# Patient Record
Sex: MALE | Race: WHITE | NOT HISPANIC OR LATINO | Employment: FULL TIME | URBAN - METROPOLITAN AREA
[De-identification: names, ages, dates, MRNs, and addresses within clinical notes are randomized per-mention and may not be internally consistent; named-entity substitution may affect disease eponyms.]

---

## 2017-02-05 ENCOUNTER — HOSPITAL ENCOUNTER (EMERGENCY)
Facility: HOSPITAL | Age: 62
Discharge: HOME | End: 2017-02-05
Attending: EMERGENCY MEDICINE | Admitting: EMERGENCY MEDICINE
Payer: COMMERCIAL

## 2017-02-05 ENCOUNTER — APPOINTMENT (EMERGENCY)
Dept: RADIOLOGY | Facility: HOSPITAL | Age: 62
End: 2017-02-05
Payer: COMMERCIAL

## 2017-02-05 VITALS
WEIGHT: 227.9 LBS | HEIGHT: 75 IN | RESPIRATION RATE: 20 BRPM | SYSTOLIC BLOOD PRESSURE: 138 MMHG | OXYGEN SATURATION: 98 % | TEMPERATURE: 98 F | BODY MASS INDEX: 28.34 KG/M2 | DIASTOLIC BLOOD PRESSURE: 63 MMHG | HEART RATE: 92 BPM

## 2017-02-05 DIAGNOSIS — L97.919 DIABETIC ULCER OF RIGHT LOWER LEG (HCC): ICD-10-CM

## 2017-02-05 DIAGNOSIS — J40 BRONCHITIS: Primary | ICD-10-CM

## 2017-02-05 DIAGNOSIS — E11.622 DIABETIC ULCER OF RIGHT LOWER LEG (HCC): ICD-10-CM

## 2017-02-05 LAB
ANION GAP SERPL CALCULATED.3IONS-SCNC: 8 MMOL/L (ref 4–13)
BASOPHILS # BLD AUTO: 0.02 THOUSANDS/ΜL (ref 0–0.1)
BASOPHILS NFR BLD AUTO: 0 % (ref 0–1)
BUN SERPL-MCNC: 14 MG/DL (ref 5–25)
CALCIUM SERPL-MCNC: 9.1 MG/DL (ref 8.3–10.1)
CHLORIDE SERPL-SCNC: 97 MMOL/L (ref 100–108)
CO2 SERPL-SCNC: 30 MMOL/L (ref 21–32)
CREAT SERPL-MCNC: 1.31 MG/DL (ref 0.6–1.3)
EOSINOPHIL # BLD AUTO: 0 THOUSAND/ΜL (ref 0–0.61)
EOSINOPHIL NFR BLD AUTO: 0 % (ref 0–6)
ERYTHROCYTE [DISTWIDTH] IN BLOOD BY AUTOMATED COUNT: 13.9 % (ref 11.6–15.1)
GFR SERPL CREATININE-BSD FRML MDRD: 55.3 ML/MIN/1.73SQ M
GLUCOSE SERPL-MCNC: 81 MG/DL (ref 65–140)
GLUCOSE SERPL-MCNC: 91 MG/DL (ref 65–140)
HCT VFR BLD AUTO: 39.8 % (ref 36.5–49.3)
HGB BLD-MCNC: 12.8 G/DL (ref 12–17)
LYMPHOCYTES # BLD AUTO: 0.89 THOUSANDS/ΜL (ref 0.6–4.47)
LYMPHOCYTES NFR BLD AUTO: 7 % (ref 14–44)
MCH RBC QN AUTO: 25.8 PG (ref 26.8–34.3)
MCHC RBC AUTO-ENTMCNC: 32.2 G/DL (ref 31.4–37.4)
MCV RBC AUTO: 80 FL (ref 82–98)
MONOCYTES # BLD AUTO: 1.02 THOUSAND/ΜL (ref 0.17–1.22)
MONOCYTES NFR BLD AUTO: 8 % (ref 4–12)
NEUTROPHILS # BLD AUTO: 10.84 THOUSANDS/ΜL (ref 1.85–7.62)
NEUTS SEG NFR BLD AUTO: 85 % (ref 43–75)
NRBC BLD AUTO-RTO: 0 /100 WBCS
PLATELET # BLD AUTO: 302 THOUSANDS/UL (ref 149–390)
PMV BLD AUTO: 10.9 FL (ref 8.9–12.7)
POTASSIUM SERPL-SCNC: 4.2 MMOL/L (ref 3.5–5.3)
RBC # BLD AUTO: 4.96 MILLION/UL (ref 3.88–5.62)
SODIUM SERPL-SCNC: 135 MMOL/L (ref 136–145)
TROPONIN I SERPL-MCNC: <0.02 NG/ML
WBC # BLD AUTO: 12.84 THOUSAND/UL (ref 4.31–10.16)

## 2017-02-05 PROCEDURE — 80048 BASIC METABOLIC PNL TOTAL CA: CPT | Performed by: EMERGENCY MEDICINE

## 2017-02-05 PROCEDURE — 85025 COMPLETE CBC W/AUTO DIFF WBC: CPT | Performed by: EMERGENCY MEDICINE

## 2017-02-05 PROCEDURE — 99285 EMERGENCY DEPT VISIT HI MDM: CPT

## 2017-02-05 PROCEDURE — 93005 ELECTROCARDIOGRAM TRACING: CPT | Performed by: EMERGENCY MEDICINE

## 2017-02-05 PROCEDURE — 36415 COLL VENOUS BLD VENIPUNCTURE: CPT | Performed by: EMERGENCY MEDICINE

## 2017-02-05 PROCEDURE — 84484 ASSAY OF TROPONIN QUANT: CPT | Performed by: EMERGENCY MEDICINE

## 2017-02-05 PROCEDURE — 71020 HB CHEST X-RAY 2VW FRONTAL&LATL: CPT

## 2017-02-05 PROCEDURE — 94640 AIRWAY INHALATION TREATMENT: CPT

## 2017-02-05 PROCEDURE — 87040 BLOOD CULTURE FOR BACTERIA: CPT | Performed by: EMERGENCY MEDICINE

## 2017-02-05 PROCEDURE — 82948 REAGENT STRIP/BLOOD GLUCOSE: CPT

## 2017-02-05 RX ORDER — BENZONATATE 100 MG/1
100 CAPSULE ORAL 3 TIMES DAILY PRN
COMMUNITY

## 2017-02-05 RX ORDER — SULFAMETHOXAZOLE AND TRIMETHOPRIM 800; 160 MG/1; MG/1
1 TABLET ORAL 2 TIMES DAILY
COMMUNITY
End: 2017-02-15 | Stop reason: HOSPADM

## 2017-02-05 RX ORDER — ALBUTEROL SULFATE 2.5 MG/3ML
2.5 SOLUTION RESPIRATORY (INHALATION) ONCE
Status: COMPLETED | OUTPATIENT
Start: 2017-02-05 | End: 2017-02-05

## 2017-02-05 RX ORDER — ALBUTEROL SULFATE 90 UG/1
2 AEROSOL, METERED RESPIRATORY (INHALATION) EVERY 4 HOURS PRN
Qty: 1 INHALER | Refills: 0 | Status: SHIPPED | OUTPATIENT
Start: 2017-02-05

## 2017-02-05 RX ADMIN — IPRATROPIUM BROMIDE 0.5 MG: 0.5 SOLUTION RESPIRATORY (INHALATION) at 19:34

## 2017-02-05 RX ADMIN — IPRATROPIUM BROMIDE 0.5 MG: 0.5 SOLUTION RESPIRATORY (INHALATION) at 21:44

## 2017-02-05 RX ADMIN — ALBUTEROL SULFATE 2.5 MG: 2.5 SOLUTION RESPIRATORY (INHALATION) at 19:34

## 2017-02-05 RX ADMIN — ALBUTEROL SULFATE 2.5 MG: 2.5 SOLUTION RESPIRATORY (INHALATION) at 21:44

## 2017-02-06 LAB
ATRIAL RATE: 94 BPM
P AXIS: 42 DEGREES
PR INTERVAL: 152 MS
QRS AXIS: 16 DEGREES
QRSD INTERVAL: 68 MS
QT INTERVAL: 318 MS
QTC INTERVAL: 397 MS
T WAVE AXIS: 54 DEGREES
VENTRICULAR RATE: 94 BPM

## 2017-02-08 ENCOUNTER — APPOINTMENT (EMERGENCY)
Dept: RADIOLOGY | Facility: HOSPITAL | Age: 62
DRG: 206 | End: 2017-02-08
Payer: COMMERCIAL

## 2017-02-08 ENCOUNTER — APPOINTMENT (EMERGENCY)
Dept: CT IMAGING | Facility: HOSPITAL | Age: 62
DRG: 206 | End: 2017-02-08
Payer: COMMERCIAL

## 2017-02-08 ENCOUNTER — HOSPITAL ENCOUNTER (INPATIENT)
Facility: HOSPITAL | Age: 62
LOS: 7 days | Discharge: HOME | DRG: 206 | End: 2017-02-15
Attending: EMERGENCY MEDICINE | Admitting: ANESTHESIOLOGY
Payer: COMMERCIAL

## 2017-02-08 DIAGNOSIS — I21.4 NSTEMI (NON-ST ELEVATED MYOCARDIAL INFARCTION) (HCC): ICD-10-CM

## 2017-02-08 DIAGNOSIS — K21.9 GERD (GASTROESOPHAGEAL REFLUX DISEASE): ICD-10-CM

## 2017-02-08 DIAGNOSIS — L97.519 DIABETIC ULCER OF BOTH FEET ASSOCIATED WITH TYPE 2 DIABETES MELLITUS (HCC): ICD-10-CM

## 2017-02-08 DIAGNOSIS — R06.02 SOB (SHORTNESS OF BREATH): ICD-10-CM

## 2017-02-08 DIAGNOSIS — E11.9 DIABETES MELLITUS (HCC): ICD-10-CM

## 2017-02-08 DIAGNOSIS — R63.4 WEIGHT LOSS: ICD-10-CM

## 2017-02-08 DIAGNOSIS — L97.529 DIABETIC ULCER OF BOTH FEET ASSOCIATED WITH TYPE 2 DIABETES MELLITUS (HCC): ICD-10-CM

## 2017-02-08 DIAGNOSIS — E11.621 DIABETIC ULCER OF BOTH FEET ASSOCIATED WITH TYPE 2 DIABETES MELLITUS (HCC): ICD-10-CM

## 2017-02-08 DIAGNOSIS — D64.9 ANEMIA, UNSPECIFIED TYPE: ICD-10-CM

## 2017-02-08 DIAGNOSIS — J81.1 PULMONARY EDEMA: Primary | ICD-10-CM

## 2017-02-08 PROBLEM — R53.1 WEAKNESS: Status: ACTIVE | Noted: 2017-02-08

## 2017-02-08 PROBLEM — R91.8 LUNG NODULES: Status: ACTIVE | Noted: 2017-02-08

## 2017-02-08 PROBLEM — L97.509 TYPE 2 DIABETES MELLITUS WITH FOOT ULCER (HCC): Status: ACTIVE | Noted: 2017-02-08

## 2017-02-08 PROBLEM — L97.929 ULCERS OF BOTH LOWER EXTREMITIES (HCC): Status: ACTIVE | Noted: 2017-02-08

## 2017-02-08 PROBLEM — R79.89 ELEVATED TROPONIN I LEVEL: Status: ACTIVE | Noted: 2017-02-08

## 2017-02-08 PROBLEM — R77.8 ELEVATED TROPONIN I LEVEL: Status: ACTIVE | Noted: 2017-02-08

## 2017-02-08 PROBLEM — L97.919 ULCERS OF BOTH LOWER EXTREMITIES (HCC): Status: ACTIVE | Noted: 2017-02-08

## 2017-02-08 PROBLEM — E87.1 HYPONATREMIA: Status: ACTIVE | Noted: 2017-02-08

## 2017-02-08 LAB
ALBUMIN SERPL BCP-MCNC: 2.9 G/DL (ref 3.5–5)
ALP SERPL-CCNC: 78 U/L (ref 46–116)
ALT SERPL W P-5'-P-CCNC: 32 U/L (ref 12–78)
ANION GAP SERPL CALCULATED.3IONS-SCNC: 10 MMOL/L (ref 4–13)
APAP SERPL-MCNC: <2 UG/ML (ref 10–30)
APTT PPP: 39 SECONDS (ref 24–36)
AST SERPL W P-5'-P-CCNC: 26 U/L (ref 5–45)
ATRIAL RATE: 101 BPM
BACTERIA UR QL AUTO: ABNORMAL /HPF
BASE EXCESS BLDA CALC-SCNC: 2 MMOL/L
BASOPHILS # BLD MANUAL: 0 THOUSAND/UL (ref 0–0.1)
BASOPHILS NFR MAR MANUAL: 0 % (ref 0–1)
BILIRUB SERPL-MCNC: 0.6 MG/DL (ref 0.2–1)
BILIRUB UR QL STRIP: NEGATIVE
BUN SERPL-MCNC: 21 MG/DL (ref 5–25)
CALCIUM SERPL-MCNC: 8.4 MG/DL (ref 8.3–10.1)
CHLORIDE SERPL-SCNC: 97 MMOL/L (ref 100–108)
CLARITY UR: ABNORMAL
CO2 SERPL-SCNC: 26 MMOL/L (ref 21–32)
COLOR UR: YELLOW
CREAT SERPL-MCNC: 1.24 MG/DL (ref 0.6–1.3)
EOSINOPHIL # BLD MANUAL: 0 THOUSAND/UL (ref 0–0.4)
EOSINOPHIL NFR BLD MANUAL: 0 % (ref 0–6)
ERYTHROCYTE [DISTWIDTH] IN BLOOD BY AUTOMATED COUNT: 13.8 % (ref 11.6–15.1)
ETHANOL SERPL-MCNC: <3 MG/DL (ref 0–3)
FLUAV AG SPEC QL IA: NEGATIVE
FLUBV AG SPEC QL IA: NEGATIVE
GFR SERPL CREATININE-BSD FRML MDRD: 58.9 ML/MIN/1.73SQ M
GLUCOSE SERPL-MCNC: 153 MG/DL (ref 65–140)
GLUCOSE SERPL-MCNC: 220 MG/DL (ref 65–140)
GLUCOSE SERPL-MCNC: 246 MG/DL (ref 65–140)
GLUCOSE SERPL-MCNC: 322 MG/DL (ref 65–140)
GLUCOSE SERPL-MCNC: 75 MG/DL (ref 65–140)
GLUCOSE SERPL-MCNC: 98 MG/DL (ref 65–140)
GLUCOSE UR STRIP-MCNC: ABNORMAL MG/DL
HCO3 BLDA-SCNC: 24.3 MMOL/L (ref 22–28)
HCT VFR BLD AUTO: 36.1 % (ref 36.5–49.3)
HGB BLD-MCNC: 11.6 G/DL (ref 12–17)
HGB UR QL STRIP.AUTO: ABNORMAL
INR PPP: 1.22 (ref 0.86–1.16)
KETONES UR STRIP-MCNC: NEGATIVE MG/DL
LACTATE SERPL-SCNC: 1.3 MMOL/L (ref 0.5–2)
LEUKOCYTE ESTERASE UR QL STRIP: NEGATIVE
LYMPHOCYTES # BLD AUTO: 0.44 THOUSAND/UL (ref 0.6–4.47)
LYMPHOCYTES # BLD AUTO: 4 % (ref 14–44)
MCH RBC QN AUTO: 25.4 PG (ref 26.8–34.3)
MCHC RBC AUTO-ENTMCNC: 32.1 G/DL (ref 31.4–37.4)
MCV RBC AUTO: 79 FL (ref 82–98)
MONOCYTES # BLD AUTO: 0.65 THOUSAND/UL (ref 0–1.22)
MONOCYTES NFR BLD: 6 % (ref 4–12)
NEUTROPHILS # BLD MANUAL: 9.79 THOUSAND/UL (ref 1.85–7.62)
NEUTS BAND NFR BLD MANUAL: 4 % (ref 0–8)
NEUTS SEG NFR BLD AUTO: 86 % (ref 43–75)
NITRITE UR QL STRIP: NEGATIVE
NON-SQ EPI CELLS URNS QL MICRO: ABNORMAL /HPF
NRBC BLD AUTO-RTO: 0 /100 WBCS
NT-PROBNP SERPL-MCNC: 2414 PG/ML
O2 CT BLDA-SCNC: 17.2 ML/DL (ref 16–23)
OXYHGB MFR BLDA: 96.4 % (ref 94–97)
P AXIS: 49 DEGREES
PCO2 BLDA: 30.9 MM HG (ref 36–44)
PH BLDA: 7.51 [PH] (ref 7.35–7.45)
PH UR STRIP.AUTO: 7 [PH] (ref 4.5–8)
PLATELET # BLD AUTO: 251 THOUSANDS/UL (ref 149–390)
PLATELET # BLD AUTO: 261 THOUSANDS/UL (ref 149–390)
PLATELET BLD QL SMEAR: ADEQUATE
PMV BLD AUTO: 11.6 FL (ref 8.9–12.7)
PMV BLD AUTO: 12.2 FL (ref 8.9–12.7)
PO2 BLDA: 91 MM HG (ref 75–129)
POTASSIUM SERPL-SCNC: 3.9 MMOL/L (ref 3.5–5.3)
PR INTERVAL: 144 MS
PROT SERPL-MCNC: 7.2 G/DL (ref 6.4–8.2)
PROT UR STRIP-MCNC: ABNORMAL MG/DL
PROTHROMBIN TIME: 15.2 SECONDS (ref 12–14.3)
QRS AXIS: 10 DEGREES
QRSD INTERVAL: 82 MS
QT INTERVAL: 332 MS
QTC INTERVAL: 430 MS
RBC # BLD AUTO: 4.56 MILLION/UL (ref 3.88–5.62)
RBC #/AREA URNS AUTO: ABNORMAL /HPF
SALICYLATES SERPL-MCNC: 5.3 MG/DL (ref 3–20)
SODIUM SERPL-SCNC: 133 MMOL/L (ref 136–145)
SP GR UR STRIP.AUTO: 1.01 (ref 1–1.03)
T WAVE AXIS: 66 DEGREES
TOTAL CELLS COUNTED SPEC: 100
TROPONIN I SERPL-MCNC: 0.09 NG/ML
TROPONIN I SERPL-MCNC: 0.13 NG/ML
TROPONIN I SERPL-MCNC: 0.21 NG/ML
TSH SERPL DL<=0.05 MIU/L-ACNC: 0.72 UIU/ML (ref 0.36–3.74)
UROBILINOGEN UR QL STRIP.AUTO: 1 E.U./DL
VENTRICULAR RATE: 101 BPM
WBC # BLD AUTO: 10.88 THOUSAND/UL (ref 4.31–10.16)
WBC #/AREA URNS AUTO: ABNORMAL /HPF

## 2017-02-08 PROCEDURE — A9270 NON-COVERED ITEM OR SERVICE: HCPCS | Performed by: NURSE PRACTITIONER

## 2017-02-08 PROCEDURE — 84484 ASSAY OF TROPONIN QUANT: CPT | Performed by: NURSE PRACTITIONER

## 2017-02-08 PROCEDURE — 87205 SMEAR GRAM STAIN: CPT | Performed by: PHYSICIAN ASSISTANT

## 2017-02-08 PROCEDURE — 87798 DETECT AGENT NOS DNA AMP: CPT | Performed by: NURSE PRACTITIONER

## 2017-02-08 PROCEDURE — G0480 DRUG TEST DEF 1-7 CLASSES: HCPCS | Performed by: PHYSICIAN ASSISTANT

## 2017-02-08 PROCEDURE — 36600 WITHDRAWAL OF ARTERIAL BLOOD: CPT

## 2017-02-08 PROCEDURE — 82805 BLOOD GASES W/O2 SATURATION: CPT | Performed by: PHYSICIAN ASSISTANT

## 2017-02-08 PROCEDURE — 81001 URINALYSIS AUTO W/SCOPE: CPT | Performed by: NURSE PRACTITIONER

## 2017-02-08 PROCEDURE — 80320 DRUG SCREEN QUANTALCOHOLS: CPT | Performed by: PHYSICIAN ASSISTANT

## 2017-02-08 PROCEDURE — 94640 AIRWAY INHALATION TREATMENT: CPT

## 2017-02-08 PROCEDURE — 84484 ASSAY OF TROPONIN QUANT: CPT | Performed by: EMERGENCY MEDICINE

## 2017-02-08 PROCEDURE — 80053 COMPREHEN METABOLIC PANEL: CPT | Performed by: PHYSICIAN ASSISTANT

## 2017-02-08 PROCEDURE — 99285 EMERGENCY DEPT VISIT HI MDM: CPT

## 2017-02-08 PROCEDURE — 96374 THER/PROPH/DIAG INJ IV PUSH: CPT

## 2017-02-08 PROCEDURE — 85007 BL SMEAR W/DIFF WBC COUNT: CPT | Performed by: EMERGENCY MEDICINE

## 2017-02-08 PROCEDURE — 85049 AUTOMATED PLATELET COUNT: CPT | Performed by: NURSE PRACTITIONER

## 2017-02-08 PROCEDURE — 71010 HB CHEST X-RAY 1 VIEW FRONTAL (PORTABLE): CPT

## 2017-02-08 PROCEDURE — 85610 PROTHROMBIN TIME: CPT | Performed by: PHYSICIAN ASSISTANT

## 2017-02-08 PROCEDURE — 93005 ELECTROCARDIOGRAM TRACING: CPT | Performed by: EMERGENCY MEDICINE

## 2017-02-08 PROCEDURE — 71275 CT ANGIOGRAPHY CHEST: CPT

## 2017-02-08 PROCEDURE — A9270 NON-COVERED ITEM OR SERVICE: HCPCS | Performed by: PHYSICIAN ASSISTANT

## 2017-02-08 PROCEDURE — 83880 ASSAY OF NATRIURETIC PEPTIDE: CPT | Performed by: EMERGENCY MEDICINE

## 2017-02-08 PROCEDURE — 83605 ASSAY OF LACTIC ACID: CPT | Performed by: PHYSICIAN ASSISTANT

## 2017-02-08 PROCEDURE — 85730 THROMBOPLASTIN TIME PARTIAL: CPT | Performed by: PHYSICIAN ASSISTANT

## 2017-02-08 PROCEDURE — A9270 NON-COVERED ITEM OR SERVICE: HCPCS | Performed by: EMERGENCY MEDICINE

## 2017-02-08 PROCEDURE — 96361 HYDRATE IV INFUSION ADD-ON: CPT

## 2017-02-08 PROCEDURE — 36415 COLL VENOUS BLD VENIPUNCTURE: CPT | Performed by: EMERGENCY MEDICINE

## 2017-02-08 PROCEDURE — 87040 BLOOD CULTURE FOR BACTERIA: CPT | Performed by: PHYSICIAN ASSISTANT

## 2017-02-08 PROCEDURE — 84443 ASSAY THYROID STIM HORMONE: CPT | Performed by: NURSE PRACTITIONER

## 2017-02-08 PROCEDURE — 94760 N-INVAS EAR/PLS OXIMETRY 1: CPT

## 2017-02-08 PROCEDURE — 80329 ANALGESICS NON-OPIOID 1 OR 2: CPT | Performed by: PHYSICIAN ASSISTANT

## 2017-02-08 PROCEDURE — 85027 COMPLETE CBC AUTOMATED: CPT | Performed by: EMERGENCY MEDICINE

## 2017-02-08 PROCEDURE — 87086 URINE CULTURE/COLONY COUNT: CPT | Performed by: NURSE PRACTITIONER

## 2017-02-08 PROCEDURE — 87070 CULTURE OTHR SPECIMN AEROBIC: CPT | Performed by: PHYSICIAN ASSISTANT

## 2017-02-08 PROCEDURE — 70450 CT HEAD/BRAIN W/O DYE: CPT

## 2017-02-08 PROCEDURE — 93005 ELECTROCARDIOGRAM TRACING: CPT | Performed by: PHYSICIAN ASSISTANT

## 2017-02-08 PROCEDURE — 82948 REAGENT STRIP/BLOOD GLUCOSE: CPT

## 2017-02-08 PROCEDURE — 87400 INFLUENZA A/B EACH AG IA: CPT | Performed by: NURSE PRACTITIONER

## 2017-02-08 RX ORDER — SODIUM CHLORIDE 9 MG/ML
125 INJECTION, SOLUTION INTRAVENOUS CONTINUOUS
Status: DISCONTINUED | OUTPATIENT
Start: 2017-02-08 | End: 2017-02-10

## 2017-02-08 RX ORDER — HEPARIN SODIUM 5000 [USP'U]/ML
5000 INJECTION, SOLUTION INTRAVENOUS; SUBCUTANEOUS EVERY 8 HOURS SCHEDULED
Status: DISCONTINUED | OUTPATIENT
Start: 2017-02-08 | End: 2017-02-15 | Stop reason: HOSPADM

## 2017-02-08 RX ORDER — LEVALBUTEROL 1.25 MG/.5ML
1.25 SOLUTION, CONCENTRATE RESPIRATORY (INHALATION)
Status: DISCONTINUED | OUTPATIENT
Start: 2017-02-08 | End: 2017-02-09

## 2017-02-08 RX ORDER — OXYCODONE HYDROCHLORIDE 5 MG/1
2.5 TABLET ORAL ONCE
Status: COMPLETED | OUTPATIENT
Start: 2017-02-08 | End: 2017-02-08

## 2017-02-08 RX ORDER — ACETAMINOPHEN 325 MG/1
650 TABLET ORAL EVERY 6 HOURS PRN
Status: DISCONTINUED | OUTPATIENT
Start: 2017-02-08 | End: 2017-02-09

## 2017-02-08 RX ORDER — ASPIRIN 81 MG/1
324 TABLET, CHEWABLE ORAL ONCE
Status: COMPLETED | OUTPATIENT
Start: 2017-02-08 | End: 2017-02-08

## 2017-02-08 RX ORDER — PANTOPRAZOLE SODIUM 40 MG/1
40 TABLET, DELAYED RELEASE ORAL
Status: DISCONTINUED | OUTPATIENT
Start: 2017-02-08 | End: 2017-02-15 | Stop reason: HOSPADM

## 2017-02-08 RX ORDER — ONDANSETRON 2 MG/ML
4 INJECTION INTRAMUSCULAR; INTRAVENOUS EVERY 6 HOURS PRN
Status: DISCONTINUED | OUTPATIENT
Start: 2017-02-08 | End: 2017-02-15 | Stop reason: HOSPADM

## 2017-02-08 RX ORDER — FUROSEMIDE 10 MG/ML
20 INJECTION INTRAMUSCULAR; INTRAVENOUS ONCE
Status: COMPLETED | OUTPATIENT
Start: 2017-02-08 | End: 2017-02-08

## 2017-02-08 RX ADMIN — ASPIRIN 81 MG 324 MG: 81 TABLET ORAL at 11:39

## 2017-02-08 RX ADMIN — LEVALBUTEROL HYDROCHLORIDE 1.25 MG: 1.25 SOLUTION, CONCENTRATE RESPIRATORY (INHALATION) at 20:04

## 2017-02-08 RX ADMIN — INSULIN LISPRO 2 UNITS: 100 INJECTION, SOLUTION INTRAVENOUS; SUBCUTANEOUS at 21:28

## 2017-02-08 RX ADMIN — HEPARIN SODIUM 5000 UNITS: 5000 INJECTION, SOLUTION INTRAVENOUS; SUBCUTANEOUS at 21:30

## 2017-02-08 RX ADMIN — IPRATROPIUM BROMIDE 0.5 MG: 0.5 SOLUTION RESPIRATORY (INHALATION) at 16:18

## 2017-02-08 RX ADMIN — SODIUM CHLORIDE 1000 ML: 0.9 INJECTION, SOLUTION INTRAVENOUS at 11:38

## 2017-02-08 RX ADMIN — IPRATROPIUM BROMIDE 0.5 MG: 0.5 SOLUTION RESPIRATORY (INHALATION) at 20:04

## 2017-02-08 RX ADMIN — ACETAMINOPHEN 650 MG: 325 TABLET ORAL at 19:15

## 2017-02-08 RX ADMIN — OXYCODONE HYDROCHLORIDE 2.5 MG: 5 TABLET ORAL at 21:18

## 2017-02-08 RX ADMIN — FUROSEMIDE 20 MG: 10 INJECTION, SOLUTION INTRAMUSCULAR; INTRAVENOUS at 13:38

## 2017-02-08 RX ADMIN — SODIUM CHLORIDE 125 ML/HR: 0.9 INJECTION, SOLUTION INTRAVENOUS at 16:06

## 2017-02-08 RX ADMIN — IOHEXOL 85 ML: 350 INJECTION, SOLUTION INTRAVENOUS at 12:45

## 2017-02-08 RX ADMIN — LEVALBUTEROL HYDROCHLORIDE 1.25 MG: 1.25 SOLUTION, CONCENTRATE RESPIRATORY (INHALATION) at 16:18

## 2017-02-08 RX ADMIN — HEPARIN SODIUM 5000 UNITS: 5000 INJECTION, SOLUTION INTRAVENOUS; SUBCUTANEOUS at 16:19

## 2017-02-08 RX ADMIN — PANTOPRAZOLE SODIUM 40 MG: 40 TABLET, DELAYED RELEASE ORAL at 16:20

## 2017-02-09 ENCOUNTER — APPOINTMENT (INPATIENT)
Dept: CT IMAGING | Facility: HOSPITAL | Age: 62
DRG: 206 | End: 2017-02-09
Payer: COMMERCIAL

## 2017-02-09 ENCOUNTER — ANESTHESIA EVENT (INPATIENT)
Dept: PERIOP | Facility: HOSPITAL | Age: 62
DRG: 206 | End: 2017-02-09
Payer: COMMERCIAL

## 2017-02-09 ENCOUNTER — APPOINTMENT (INPATIENT)
Dept: NON INVASIVE DIAGNOSTICS | Facility: HOSPITAL | Age: 62
DRG: 206 | End: 2017-02-09
Payer: COMMERCIAL

## 2017-02-09 LAB
ALBUMIN SERPL BCP-MCNC: 2.4 G/DL (ref 3.5–5)
ALP SERPL-CCNC: 78 U/L (ref 46–116)
ALT SERPL W P-5'-P-CCNC: 35 U/L (ref 12–78)
ANION GAP SERPL CALCULATED.3IONS-SCNC: 7 MMOL/L (ref 4–13)
AST SERPL W P-5'-P-CCNC: 44 U/L (ref 5–45)
BASOPHILS # BLD AUTO: 0.03 THOUSANDS/ΜL (ref 0–0.1)
BASOPHILS NFR BLD AUTO: 0 % (ref 0–1)
BILIRUB SERPL-MCNC: 0.4 MG/DL (ref 0.2–1)
BUN SERPL-MCNC: 14 MG/DL (ref 5–25)
CALCIUM SERPL-MCNC: 8.1 MG/DL (ref 8.3–10.1)
CHLORIDE SERPL-SCNC: 101 MMOL/L (ref 100–108)
CO2 SERPL-SCNC: 27 MMOL/L (ref 21–32)
CREAT SERPL-MCNC: 1.14 MG/DL (ref 0.6–1.3)
EOSINOPHIL # BLD AUTO: 0.23 THOUSAND/ΜL (ref 0–0.61)
EOSINOPHIL NFR BLD AUTO: 3 % (ref 0–6)
ERYTHROCYTE [DISTWIDTH] IN BLOOD BY AUTOMATED COUNT: 14.2 % (ref 11.6–15.1)
EST. AVERAGE GLUCOSE BLD GHB EST-MCNC: 249 MG/DL
FERRITIN SERPL-MCNC: 650 NG/ML (ref 8–388)
FLUAV AG SPEC QL: NORMAL
FLUBV AG SPEC QL: NORMAL
GFR SERPL CREATININE-BSD FRML MDRD: >60 ML/MIN/1.73SQ M
GLUCOSE SERPL-MCNC: 128 MG/DL (ref 65–140)
GLUCOSE SERPL-MCNC: 142 MG/DL (ref 65–140)
GLUCOSE SERPL-MCNC: 150 MG/DL (ref 65–140)
GLUCOSE SERPL-MCNC: 201 MG/DL (ref 65–140)
GLUCOSE SERPL-MCNC: 213 MG/DL (ref 65–140)
GLUCOSE SERPL-MCNC: 232 MG/DL (ref 65–140)
HBA1C MFR BLD: 10.3 % (ref 4.2–6.3)
HCT VFR BLD AUTO: 35.1 % (ref 36.5–49.3)
HGB BLD-MCNC: 11 G/DL (ref 12–17)
IRON SATN MFR SERPL: 13 %
IRON SERPL-MCNC: 21 UG/DL (ref 65–175)
LYMPHOCYTES # BLD AUTO: 0.83 THOUSANDS/ΜL (ref 0.6–4.47)
LYMPHOCYTES NFR BLD AUTO: 12 % (ref 14–44)
MCH RBC QN AUTO: 25.6 PG (ref 26.8–34.3)
MCHC RBC AUTO-ENTMCNC: 31.3 G/DL (ref 31.4–37.4)
MCV RBC AUTO: 82 FL (ref 82–98)
MONOCYTES # BLD AUTO: 0.61 THOUSAND/ΜL (ref 0.17–1.22)
MONOCYTES NFR BLD AUTO: 9 % (ref 4–12)
NEUTROPHILS # BLD AUTO: 5.37 THOUSANDS/ΜL (ref 1.85–7.62)
NEUTS SEG NFR BLD AUTO: 75 % (ref 43–75)
NRBC BLD AUTO-RTO: 0 /100 WBCS
PLATELET # BLD AUTO: 203 THOUSANDS/UL (ref 149–390)
PMV BLD AUTO: 11.8 FL (ref 8.9–12.7)
POTASSIUM SERPL-SCNC: 4 MMOL/L (ref 3.5–5.3)
PROT SERPL-MCNC: 7.2 G/DL (ref 6.4–8.2)
RBC # BLD AUTO: 4.3 MILLION/UL (ref 3.88–5.62)
RSV B RNA SPEC QL NAA+PROBE: NORMAL
SODIUM SERPL-SCNC: 135 MMOL/L (ref 136–145)
TIBC SERPL-MCNC: 156 UG/DL (ref 250–450)
WBC # BLD AUTO: 7.12 THOUSAND/UL (ref 4.31–10.16)

## 2017-02-09 PROCEDURE — 83540 ASSAY OF IRON: CPT | Performed by: NURSE PRACTITIONER

## 2017-02-09 PROCEDURE — A9270 NON-COVERED ITEM OR SERVICE: HCPCS | Performed by: PHYSICIAN ASSISTANT

## 2017-02-09 PROCEDURE — 72125 CT NECK SPINE W/O DYE: CPT

## 2017-02-09 PROCEDURE — 83036 HEMOGLOBIN GLYCOSYLATED A1C: CPT | Performed by: NURSE PRACTITIONER

## 2017-02-09 PROCEDURE — 82948 REAGENT STRIP/BLOOD GLUCOSE: CPT

## 2017-02-09 PROCEDURE — 83550 IRON BINDING TEST: CPT | Performed by: NURSE PRACTITIONER

## 2017-02-09 PROCEDURE — A9270 NON-COVERED ITEM OR SERVICE: HCPCS | Performed by: NURSE PRACTITIONER

## 2017-02-09 PROCEDURE — 80053 COMPREHEN METABOLIC PANEL: CPT | Performed by: NURSE PRACTITIONER

## 2017-02-09 PROCEDURE — 87205 SMEAR GRAM STAIN: CPT | Performed by: PODIATRIST

## 2017-02-09 PROCEDURE — 74178 CT ABD&PLV WO CNTR FLWD CNTR: CPT

## 2017-02-09 PROCEDURE — 93306 TTE W/DOPPLER COMPLETE: CPT

## 2017-02-09 PROCEDURE — 85025 COMPLETE CBC W/AUTO DIFF WBC: CPT | Performed by: NURSE PRACTITIONER

## 2017-02-09 PROCEDURE — 87070 CULTURE OTHR SPECIMN AEROBIC: CPT | Performed by: PODIATRIST

## 2017-02-09 PROCEDURE — 94640 AIRWAY INHALATION TREATMENT: CPT

## 2017-02-09 PROCEDURE — 82728 ASSAY OF FERRITIN: CPT | Performed by: NURSE PRACTITIONER

## 2017-02-09 PROCEDURE — 94760 N-INVAS EAR/PLS OXIMETRY 1: CPT

## 2017-02-09 RX ORDER — LEVALBUTEROL 1.25 MG/.5ML
1.25 SOLUTION, CONCENTRATE RESPIRATORY (INHALATION)
Status: DISCONTINUED | OUTPATIENT
Start: 2017-02-09 | End: 2017-02-11

## 2017-02-09 RX ORDER — LEVALBUTEROL 1.25 MG/.5ML
1.25 SOLUTION, CONCENTRATE RESPIRATORY (INHALATION) EVERY 6 HOURS PRN
Status: DISCONTINUED | OUTPATIENT
Start: 2017-02-09 | End: 2017-02-15 | Stop reason: HOSPADM

## 2017-02-09 RX ORDER — OXYCODONE HYDROCHLORIDE 5 MG/1
2.5 TABLET ORAL EVERY 4 HOURS PRN
Status: DISCONTINUED | OUTPATIENT
Start: 2017-02-09 | End: 2017-02-15 | Stop reason: HOSPADM

## 2017-02-09 RX ORDER — ACETAMINOPHEN 325 MG/1
650 TABLET ORAL EVERY 6 HOURS PRN
Status: DISCONTINUED | OUTPATIENT
Start: 2017-02-09 | End: 2017-02-15 | Stop reason: HOSPADM

## 2017-02-09 RX ADMIN — INSULIN LISPRO 1 UNITS: 100 INJECTION, SOLUTION INTRAVENOUS; SUBCUTANEOUS at 21:16

## 2017-02-09 RX ADMIN — SODIUM CHLORIDE 125 ML/HR: 0.9 INJECTION, SOLUTION INTRAVENOUS at 00:04

## 2017-02-09 RX ADMIN — INSULIN LISPRO 3 UNITS: 100 INJECTION, SOLUTION INTRAVENOUS; SUBCUTANEOUS at 11:18

## 2017-02-09 RX ADMIN — LEVALBUTEROL HYDROCHLORIDE 1.25 MG: 1.25 SOLUTION, CONCENTRATE RESPIRATORY (INHALATION) at 20:31

## 2017-02-09 RX ADMIN — LEVALBUTEROL HYDROCHLORIDE 1.25 MG: 1.25 SOLUTION, CONCENTRATE RESPIRATORY (INHALATION) at 07:26

## 2017-02-09 RX ADMIN — SODIUM CHLORIDE 125 ML/HR: 0.9 INJECTION, SOLUTION INTRAVENOUS at 17:27

## 2017-02-09 RX ADMIN — CEFTRIAXONE 1000 MG: 1 INJECTION, POWDER, FOR SOLUTION INTRAMUSCULAR; INTRAVENOUS at 09:29

## 2017-02-09 RX ADMIN — IPRATROPIUM BROMIDE 0.5 MG: 0.5 SOLUTION RESPIRATORY (INHALATION) at 07:26

## 2017-02-09 RX ADMIN — OXYCODONE HYDROCHLORIDE 2.5 MG: 5 TABLET ORAL at 21:10

## 2017-02-09 RX ADMIN — OXYCODONE HYDROCHLORIDE 2.5 MG: 5 TABLET ORAL at 14:42

## 2017-02-09 RX ADMIN — LEVALBUTEROL HYDROCHLORIDE 1.25 MG: 1.25 SOLUTION, CONCENTRATE RESPIRATORY (INHALATION) at 02:09

## 2017-02-09 RX ADMIN — PANTOPRAZOLE SODIUM 40 MG: 40 TABLET, DELAYED RELEASE ORAL at 16:15

## 2017-02-09 RX ADMIN — HEPARIN SODIUM 5000 UNITS: 5000 INJECTION, SOLUTION INTRAVENOUS; SUBCUTANEOUS at 06:13

## 2017-02-09 RX ADMIN — PANTOPRAZOLE SODIUM 40 MG: 40 TABLET, DELAYED RELEASE ORAL at 06:20

## 2017-02-09 RX ADMIN — INSULIN LISPRO 1 UNITS: 100 INJECTION, SOLUTION INTRAVENOUS; SUBCUTANEOUS at 06:13

## 2017-02-09 RX ADMIN — IPRATROPIUM BROMIDE 0.5 MG: 0.5 SOLUTION RESPIRATORY (INHALATION) at 13:46

## 2017-02-09 RX ADMIN — IOHEXOL 100 ML: 350 INJECTION, SOLUTION INTRAVENOUS at 13:14

## 2017-02-09 RX ADMIN — SODIUM CHLORIDE 125 ML/HR: 0.9 INJECTION, SOLUTION INTRAVENOUS at 08:31

## 2017-02-09 RX ADMIN — IPRATROPIUM BROMIDE 0.5 MG: 0.5 SOLUTION RESPIRATORY (INHALATION) at 20:31

## 2017-02-09 RX ADMIN — OXYCODONE HYDROCHLORIDE 2.5 MG: 5 TABLET ORAL at 04:44

## 2017-02-09 RX ADMIN — IPRATROPIUM BROMIDE 0.5 MG: 0.5 SOLUTION RESPIRATORY (INHALATION) at 02:09

## 2017-02-09 RX ADMIN — LEVALBUTEROL HYDROCHLORIDE 1.25 MG: 1.25 SOLUTION, CONCENTRATE RESPIRATORY (INHALATION) at 13:46

## 2017-02-09 RX ADMIN — INSULIN LISPRO 2 UNITS: 100 INJECTION, SOLUTION INTRAVENOUS; SUBCUTANEOUS at 16:15

## 2017-02-09 RX ADMIN — HEPARIN SODIUM 5000 UNITS: 5000 INJECTION, SOLUTION INTRAVENOUS; SUBCUTANEOUS at 21:09

## 2017-02-09 RX ADMIN — HEPARIN SODIUM 5000 UNITS: 5000 INJECTION, SOLUTION INTRAVENOUS; SUBCUTANEOUS at 14:38

## 2017-02-10 ENCOUNTER — ANESTHESIA (INPATIENT)
Dept: PERIOP | Facility: HOSPITAL | Age: 62
DRG: 206 | End: 2017-02-10
Payer: COMMERCIAL

## 2017-02-10 LAB
ANION GAP SERPL CALCULATED.3IONS-SCNC: 9 MMOL/L (ref 4–13)
BACTERIA UR CULT: NORMAL
BUN SERPL-MCNC: 11 MG/DL (ref 5–25)
CALCIUM SERPL-MCNC: 7.6 MG/DL (ref 8.3–10.1)
CHLORIDE SERPL-SCNC: 102 MMOL/L (ref 100–108)
CO2 SERPL-SCNC: 24 MMOL/L (ref 21–32)
CREAT SERPL-MCNC: 0.91 MG/DL (ref 0.6–1.3)
ERYTHROCYTE [DISTWIDTH] IN BLOOD BY AUTOMATED COUNT: 14.2 % (ref 11.6–15.1)
GFR SERPL CREATININE-BSD FRML MDRD: >60 ML/MIN/1.73SQ M
GLUCOSE SERPL-MCNC: 153 MG/DL (ref 65–140)
GLUCOSE SERPL-MCNC: 193 MG/DL (ref 65–140)
GLUCOSE SERPL-MCNC: 194 MG/DL (ref 65–140)
GLUCOSE SERPL-MCNC: 196 MG/DL (ref 65–140)
GLUCOSE SERPL-MCNC: 216 MG/DL (ref 65–140)
GLUCOSE SERPL-MCNC: 263 MG/DL (ref 65–140)
GLUCOSE SERPL-MCNC: 277 MG/DL (ref 65–140)
HCT VFR BLD AUTO: 29.7 % (ref 36.5–49.3)
HGB BLD-MCNC: 9.5 G/DL (ref 12–17)
MCH RBC QN AUTO: 25.9 PG (ref 26.8–34.3)
MCHC RBC AUTO-ENTMCNC: 32 G/DL (ref 31.4–37.4)
MCV RBC AUTO: 81 FL (ref 82–98)
PLATELET # BLD AUTO: 197 THOUSANDS/UL (ref 149–390)
PMV BLD AUTO: 11.5 FL (ref 8.9–12.7)
POTASSIUM SERPL-SCNC: 3.7 MMOL/L (ref 3.5–5.3)
RBC # BLD AUTO: 3.67 MILLION/UL (ref 3.88–5.62)
SODIUM SERPL-SCNC: 135 MMOL/L (ref 136–145)
WBC # BLD AUTO: 5.74 THOUSAND/UL (ref 4.31–10.16)

## 2017-02-10 PROCEDURE — 82948 REAGENT STRIP/BLOOD GLUCOSE: CPT

## 2017-02-10 PROCEDURE — A9270 NON-COVERED ITEM OR SERVICE: HCPCS | Performed by: PHYSICIAN ASSISTANT

## 2017-02-10 PROCEDURE — 0DB98ZX EXCISION OF DUODENUM, VIA NATURAL OR ARTIFICIAL OPENING ENDOSCOPIC, DIAGNOSTIC: ICD-10-PCS | Performed by: INTERNAL MEDICINE

## 2017-02-10 PROCEDURE — 80048 BASIC METABOLIC PNL TOTAL CA: CPT | Performed by: NURSE PRACTITIONER

## 2017-02-10 PROCEDURE — 0DB38ZX EXCISION OF LOWER ESOPHAGUS, VIA NATURAL OR ARTIFICIAL OPENING ENDOSCOPIC, DIAGNOSTIC: ICD-10-PCS | Performed by: INTERNAL MEDICINE

## 2017-02-10 PROCEDURE — 85027 COMPLETE CBC AUTOMATED: CPT | Performed by: NURSE PRACTITIONER

## 2017-02-10 PROCEDURE — 0DB68ZX EXCISION OF STOMACH, VIA NATURAL OR ARTIFICIAL OPENING ENDOSCOPIC, DIAGNOSTIC: ICD-10-PCS | Performed by: INTERNAL MEDICINE

## 2017-02-10 PROCEDURE — A9270 NON-COVERED ITEM OR SERVICE: HCPCS | Performed by: NURSE PRACTITIONER

## 2017-02-10 PROCEDURE — 88342 IMHCHEM/IMCYTCHM 1ST ANTB: CPT | Performed by: INTERNAL MEDICINE

## 2017-02-10 PROCEDURE — 94640 AIRWAY INHALATION TREATMENT: CPT

## 2017-02-10 PROCEDURE — 0DB18ZX EXCISION OF UPPER ESOPHAGUS, VIA NATURAL OR ARTIFICIAL OPENING ENDOSCOPIC, DIAGNOSTIC: ICD-10-PCS | Performed by: INTERNAL MEDICINE

## 2017-02-10 PROCEDURE — 94760 N-INVAS EAR/PLS OXIMETRY 1: CPT

## 2017-02-10 PROCEDURE — 88305 TISSUE EXAM BY PATHOLOGIST: CPT | Performed by: INTERNAL MEDICINE

## 2017-02-10 PROCEDURE — 0DB28ZX EXCISION OF MIDDLE ESOPHAGUS, VIA NATURAL OR ARTIFICIAL OPENING ENDOSCOPIC, DIAGNOSTIC: ICD-10-PCS | Performed by: INTERNAL MEDICINE

## 2017-02-10 RX ORDER — PROPOFOL 10 MG/ML
INJECTION, EMULSION INTRAVENOUS AS NEEDED
Status: DISCONTINUED | OUTPATIENT
Start: 2017-02-10 | End: 2017-02-10 | Stop reason: SURG

## 2017-02-10 RX ORDER — SODIUM CHLORIDE 9 MG/ML
50 INJECTION, SOLUTION INTRAVENOUS CONTINUOUS
Status: DISCONTINUED | OUTPATIENT
Start: 2017-02-10 | End: 2017-02-14

## 2017-02-10 RX ORDER — POTASSIUM CHLORIDE 20 MEQ/1
40 TABLET, EXTENDED RELEASE ORAL ONCE
Status: COMPLETED | OUTPATIENT
Start: 2017-02-10 | End: 2017-02-10

## 2017-02-10 RX ADMIN — PROPOFOL 30 MG: 10 INJECTION, EMULSION INTRAVENOUS at 14:44

## 2017-02-10 RX ADMIN — PROPOFOL 30 MG: 10 INJECTION, EMULSION INTRAVENOUS at 14:48

## 2017-02-10 RX ADMIN — LEVALBUTEROL HYDROCHLORIDE 1.25 MG: 1.25 SOLUTION, CONCENTRATE RESPIRATORY (INHALATION) at 07:34

## 2017-02-10 RX ADMIN — LIDOCAINE HYDROCHLORIDE 50 MG: 20 INJECTION, SOLUTION INTRAVENOUS at 14:39

## 2017-02-10 RX ADMIN — HEPARIN SODIUM 5000 UNITS: 5000 INJECTION, SOLUTION INTRAVENOUS; SUBCUTANEOUS at 16:20

## 2017-02-10 RX ADMIN — INSULIN LISPRO 1 UNITS: 100 INJECTION, SOLUTION INTRAVENOUS; SUBCUTANEOUS at 01:39

## 2017-02-10 RX ADMIN — POTASSIUM CHLORIDE 40 MEQ: 1500 TABLET, EXTENDED RELEASE ORAL at 10:54

## 2017-02-10 RX ADMIN — INSULIN LISPRO 3 UNITS: 100 INJECTION, SOLUTION INTRAVENOUS; SUBCUTANEOUS at 21:35

## 2017-02-10 RX ADMIN — INSULIN LISPRO 4 UNITS: 100 INJECTION, SOLUTION INTRAVENOUS; SUBCUTANEOUS at 11:47

## 2017-02-10 RX ADMIN — HEPARIN SODIUM 5000 UNITS: 5000 INJECTION, SOLUTION INTRAVENOUS; SUBCUTANEOUS at 06:18

## 2017-02-10 RX ADMIN — SODIUM CHLORIDE: 0.9 INJECTION, SOLUTION INTRAVENOUS at 14:11

## 2017-02-10 RX ADMIN — HEPARIN SODIUM 5000 UNITS: 5000 INJECTION, SOLUTION INTRAVENOUS; SUBCUTANEOUS at 21:34

## 2017-02-10 RX ADMIN — IPRATROPIUM BROMIDE 0.5 MG: 0.5 SOLUTION RESPIRATORY (INHALATION) at 07:34

## 2017-02-10 RX ADMIN — INSULIN LISPRO 2 UNITS: 100 INJECTION, SOLUTION INTRAVENOUS; SUBCUTANEOUS at 17:25

## 2017-02-10 RX ADMIN — CEFTRIAXONE 1000 MG: 1 INJECTION, POWDER, FOR SOLUTION INTRAMUSCULAR; INTRAVENOUS at 09:15

## 2017-02-10 RX ADMIN — PROPOFOL 100 MG: 10 INJECTION, EMULSION INTRAVENOUS at 14:40

## 2017-02-10 RX ADMIN — SODIUM CHLORIDE 50 ML/HR: 0.9 INJECTION, SOLUTION INTRAVENOUS at 07:37

## 2017-02-10 RX ADMIN — OXYCODONE HYDROCHLORIDE 2.5 MG: 5 TABLET ORAL at 21:39

## 2017-02-10 RX ADMIN — SODIUM CHLORIDE 125 ML/HR: 0.9 INJECTION, SOLUTION INTRAVENOUS at 01:34

## 2017-02-10 RX ADMIN — INSULIN LISPRO 2 UNITS: 100 INJECTION, SOLUTION INTRAVENOUS; SUBCUTANEOUS at 06:20

## 2017-02-11 LAB
BACTERIA BLD CULT: NORMAL
BACTERIA BLD CULT: NORMAL
BACTERIA SPT RESP CULT: NORMAL
BACTERIA WND AEROBE CULT: NORMAL
GLUCOSE SERPL-MCNC: 248 MG/DL (ref 65–140)
GLUCOSE SERPL-MCNC: 297 MG/DL (ref 65–140)
GLUCOSE SERPL-MCNC: 313 MG/DL (ref 65–140)
GLUCOSE SERPL-MCNC: 356 MG/DL (ref 65–140)
GRAM STN SPEC: NORMAL

## 2017-02-11 PROCEDURE — A9270 NON-COVERED ITEM OR SERVICE: HCPCS | Performed by: NURSE PRACTITIONER

## 2017-02-11 PROCEDURE — 82948 REAGENT STRIP/BLOOD GLUCOSE: CPT

## 2017-02-11 PROCEDURE — A9270 NON-COVERED ITEM OR SERVICE: HCPCS | Performed by: PHYSICIAN ASSISTANT

## 2017-02-11 RX ADMIN — PANTOPRAZOLE SODIUM 40 MG: 40 TABLET, DELAYED RELEASE ORAL at 15:09

## 2017-02-11 RX ADMIN — INSULIN LISPRO 6 UNITS: 100 INJECTION, SOLUTION INTRAVENOUS; SUBCUTANEOUS at 12:14

## 2017-02-11 RX ADMIN — PANTOPRAZOLE SODIUM 40 MG: 40 TABLET, DELAYED RELEASE ORAL at 06:14

## 2017-02-11 RX ADMIN — HEPARIN SODIUM 5000 UNITS: 5000 INJECTION, SOLUTION INTRAVENOUS; SUBCUTANEOUS at 06:14

## 2017-02-11 RX ADMIN — CEFTRIAXONE 1000 MG: 1 INJECTION, POWDER, FOR SOLUTION INTRAMUSCULAR; INTRAVENOUS at 09:03

## 2017-02-11 RX ADMIN — OXYCODONE HYDROCHLORIDE 2.5 MG: 5 TABLET ORAL at 21:23

## 2017-02-11 RX ADMIN — HEPARIN SODIUM 5000 UNITS: 5000 INJECTION, SOLUTION INTRAVENOUS; SUBCUTANEOUS at 15:09

## 2017-02-11 RX ADMIN — OXYCODONE HYDROCHLORIDE 2.5 MG: 5 TABLET ORAL at 17:29

## 2017-02-11 RX ADMIN — INSULIN LISPRO 10 UNITS: 100 INJECTION, SOLUTION INTRAVENOUS; SUBCUTANEOUS at 17:23

## 2017-02-11 RX ADMIN — HEPARIN SODIUM 5000 UNITS: 5000 INJECTION, SOLUTION INTRAVENOUS; SUBCUTANEOUS at 21:13

## 2017-02-11 RX ADMIN — INSULIN LISPRO 3 UNITS: 100 INJECTION, SOLUTION INTRAVENOUS; SUBCUTANEOUS at 21:36

## 2017-02-11 RX ADMIN — SODIUM CHLORIDE 50 ML/HR: 0.9 INJECTION, SOLUTION INTRAVENOUS at 21:35

## 2017-02-11 RX ADMIN — INSULIN LISPRO 4 UNITS: 100 INJECTION, SOLUTION INTRAVENOUS; SUBCUTANEOUS at 09:07

## 2017-02-12 LAB
GLUCOSE SERPL-MCNC: 251 MG/DL (ref 65–140)
GLUCOSE SERPL-MCNC: 261 MG/DL (ref 65–140)
GLUCOSE SERPL-MCNC: 288 MG/DL (ref 65–140)
GLUCOSE SERPL-MCNC: 331 MG/DL (ref 65–140)

## 2017-02-12 PROCEDURE — 82948 REAGENT STRIP/BLOOD GLUCOSE: CPT

## 2017-02-12 PROCEDURE — A9270 NON-COVERED ITEM OR SERVICE: HCPCS | Performed by: NURSE PRACTITIONER

## 2017-02-12 PROCEDURE — A9270 NON-COVERED ITEM OR SERVICE: HCPCS | Performed by: PHYSICIAN ASSISTANT

## 2017-02-12 PROCEDURE — A9270 NON-COVERED ITEM OR SERVICE: HCPCS | Performed by: FAMILY MEDICINE

## 2017-02-12 RX ORDER — INSULIN GLARGINE 100 [IU]/ML
15 INJECTION, SOLUTION SUBCUTANEOUS
Status: DISCONTINUED | OUTPATIENT
Start: 2017-02-12 | End: 2017-02-13

## 2017-02-12 RX ORDER — MAGNESIUM CARB/ALUMINUM HYDROX 105-160MG
296 TABLET,CHEWABLE ORAL ONCE
Status: COMPLETED | OUTPATIENT
Start: 2017-02-12 | End: 2017-02-12

## 2017-02-12 RX ORDER — POLYETHYLENE GLYCOL 3350 17 G/17G
17 POWDER, FOR SOLUTION ORAL DAILY
Status: DISCONTINUED | OUTPATIENT
Start: 2017-02-12 | End: 2017-02-15 | Stop reason: HOSPADM

## 2017-02-12 RX ADMIN — MAGESIUM CITRATE 296 ML: 1.75 LIQUID ORAL at 13:44

## 2017-02-12 RX ADMIN — PANTOPRAZOLE SODIUM 40 MG: 40 TABLET, DELAYED RELEASE ORAL at 17:41

## 2017-02-12 RX ADMIN — INSULIN LISPRO 3 UNITS: 100 INJECTION, SOLUTION INTRAVENOUS; SUBCUTANEOUS at 22:55

## 2017-02-12 RX ADMIN — INSULIN LISPRO 8 UNITS: 100 INJECTION, SOLUTION INTRAVENOUS; SUBCUTANEOUS at 11:32

## 2017-02-12 RX ADMIN — POLYETHYLENE GLYCOL 3350 17 G: 17 POWDER, FOR SOLUTION ORAL at 05:39

## 2017-02-12 RX ADMIN — INSULIN LISPRO 6 UNITS: 100 INJECTION, SOLUTION INTRAVENOUS; SUBCUTANEOUS at 07:00

## 2017-02-12 RX ADMIN — HEPARIN SODIUM 5000 UNITS: 5000 INJECTION, SOLUTION INTRAVENOUS; SUBCUTANEOUS at 05:39

## 2017-02-12 RX ADMIN — CEFTRIAXONE 1000 MG: 1 INJECTION, POWDER, FOR SOLUTION INTRAMUSCULAR; INTRAVENOUS at 09:18

## 2017-02-12 RX ADMIN — HEPARIN SODIUM 5000 UNITS: 5000 INJECTION, SOLUTION INTRAVENOUS; SUBCUTANEOUS at 17:41

## 2017-02-12 RX ADMIN — HEPARIN SODIUM 5000 UNITS: 5000 INJECTION, SOLUTION INTRAVENOUS; SUBCUTANEOUS at 22:54

## 2017-02-12 RX ADMIN — INSULIN LISPRO 6 UNITS: 100 INJECTION, SOLUTION INTRAVENOUS; SUBCUTANEOUS at 17:41

## 2017-02-12 RX ADMIN — PANTOPRAZOLE SODIUM 40 MG: 40 TABLET, DELAYED RELEASE ORAL at 07:00

## 2017-02-12 RX ADMIN — INSULIN GLARGINE 15 UNITS: 100 INJECTION, SOLUTION SUBCUTANEOUS at 22:59

## 2017-02-12 RX ADMIN — SODIUM CHLORIDE 50 ML/HR: 0.9 INJECTION, SOLUTION INTRAVENOUS at 17:47

## 2017-02-12 RX ADMIN — OXYCODONE HYDROCHLORIDE 2.5 MG: 5 TABLET ORAL at 03:23

## 2017-02-13 ENCOUNTER — APPOINTMENT (INPATIENT)
Dept: NUCLEAR MEDICINE | Facility: HOSPITAL | Age: 62
DRG: 206 | End: 2017-02-13
Payer: COMMERCIAL

## 2017-02-13 PROBLEM — R77.8 ELEVATED TROPONIN I LEVEL: Status: RESOLVED | Noted: 2017-02-08 | Resolved: 2017-02-13

## 2017-02-13 PROBLEM — E87.1 HYPONATREMIA: Status: RESOLVED | Noted: 2017-02-08 | Resolved: 2017-02-13

## 2017-02-13 PROBLEM — R79.89 ELEVATED TROPONIN I LEVEL: Status: RESOLVED | Noted: 2017-02-08 | Resolved: 2017-02-13

## 2017-02-13 PROBLEM — R22.9 MULTIPLE SKIN NODULES: Status: ACTIVE | Noted: 2017-02-13

## 2017-02-13 LAB
ANION GAP SERPL CALCULATED.3IONS-SCNC: 6 MMOL/L (ref 4–13)
BACTERIA BLD CULT: NORMAL
BUN SERPL-MCNC: 9 MG/DL (ref 5–25)
CALCIUM SERPL-MCNC: 8.5 MG/DL (ref 8.3–10.1)
CHLORIDE SERPL-SCNC: 100 MMOL/L (ref 100–108)
CO2 SERPL-SCNC: 28 MMOL/L (ref 21–32)
CREAT SERPL-MCNC: 0.97 MG/DL (ref 0.6–1.3)
CRP SERPL QL: 88.4 MG/L
ERYTHROCYTE [DISTWIDTH] IN BLOOD BY AUTOMATED COUNT: 14.3 % (ref 11.6–15.1)
ERYTHROCYTE [SEDIMENTATION RATE] IN BLOOD: 102 MM/HOUR (ref 0–10)
GFR SERPL CREATININE-BSD FRML MDRD: >60 ML/MIN/1.73SQ M
GLUCOSE SERPL-MCNC: 269 MG/DL (ref 65–140)
GLUCOSE SERPL-MCNC: 284 MG/DL (ref 65–140)
GLUCOSE SERPL-MCNC: 304 MG/DL (ref 65–140)
GLUCOSE SERPL-MCNC: 326 MG/DL (ref 65–140)
GLUCOSE SERPL-MCNC: 334 MG/DL (ref 65–140)
HCT VFR BLD AUTO: 33.3 % (ref 36.5–49.3)
HGB BLD-MCNC: 10.4 G/DL (ref 12–17)
MCH RBC QN AUTO: 25.6 PG (ref 26.8–34.3)
MCHC RBC AUTO-ENTMCNC: 31.2 G/DL (ref 31.4–37.4)
MCV RBC AUTO: 82 FL (ref 82–98)
PLATELET # BLD AUTO: 278 THOUSANDS/UL (ref 149–390)
PMV BLD AUTO: 11.3 FL (ref 8.9–12.7)
POTASSIUM SERPL-SCNC: 4.5 MMOL/L (ref 3.5–5.3)
RBC # BLD AUTO: 4.07 MILLION/UL (ref 3.88–5.62)
SODIUM SERPL-SCNC: 134 MMOL/L (ref 136–145)
WBC # BLD AUTO: 5.67 THOUSAND/UL (ref 4.31–10.16)

## 2017-02-13 PROCEDURE — 80048 BASIC METABOLIC PNL TOTAL CA: CPT | Performed by: FAMILY MEDICINE

## 2017-02-13 PROCEDURE — A9270 NON-COVERED ITEM OR SERVICE: HCPCS | Performed by: NURSE PRACTITIONER

## 2017-02-13 PROCEDURE — G8978 MOBILITY CURRENT STATUS: HCPCS

## 2017-02-13 PROCEDURE — 97162 PT EVAL MOD COMPLEX 30 MIN: CPT

## 2017-02-13 PROCEDURE — 97110 THERAPEUTIC EXERCISES: CPT

## 2017-02-13 PROCEDURE — A9270 NON-COVERED ITEM OR SERVICE: HCPCS | Performed by: PHYSICIAN ASSISTANT

## 2017-02-13 PROCEDURE — 85027 COMPLETE CBC AUTOMATED: CPT | Performed by: FAMILY MEDICINE

## 2017-02-13 PROCEDURE — G8979 MOBILITY GOAL STATUS: HCPCS

## 2017-02-13 PROCEDURE — 86140 C-REACTIVE PROTEIN: CPT | Performed by: FAMILY MEDICINE

## 2017-02-13 PROCEDURE — 85652 RBC SED RATE AUTOMATED: CPT | Performed by: FAMILY MEDICINE

## 2017-02-13 PROCEDURE — 82948 REAGENT STRIP/BLOOD GLUCOSE: CPT

## 2017-02-13 RX ORDER — INSULIN GLARGINE 100 [IU]/ML
25 INJECTION, SOLUTION SUBCUTANEOUS
Status: DISCONTINUED | OUTPATIENT
Start: 2017-02-13 | End: 2017-02-15 | Stop reason: HOSPADM

## 2017-02-13 RX ADMIN — SODIUM CHLORIDE 50 ML/HR: 0.9 INJECTION, SOLUTION INTRAVENOUS at 12:50

## 2017-02-13 RX ADMIN — CEFTRIAXONE 1000 MG: 1 INJECTION, POWDER, FOR SOLUTION INTRAMUSCULAR; INTRAVENOUS at 09:00

## 2017-02-13 RX ADMIN — PANTOPRAZOLE SODIUM 40 MG: 40 TABLET, DELAYED RELEASE ORAL at 06:06

## 2017-02-13 RX ADMIN — HEPARIN SODIUM 5000 UNITS: 5000 INJECTION, SOLUTION INTRAVENOUS; SUBCUTANEOUS at 21:24

## 2017-02-13 RX ADMIN — INSULIN LISPRO 4 UNITS: 100 INJECTION, SOLUTION INTRAVENOUS; SUBCUTANEOUS at 21:30

## 2017-02-13 RX ADMIN — PANTOPRAZOLE SODIUM 40 MG: 40 TABLET, DELAYED RELEASE ORAL at 17:34

## 2017-02-13 RX ADMIN — INSULIN LISPRO 8 UNITS: 100 INJECTION, SOLUTION INTRAVENOUS; SUBCUTANEOUS at 17:34

## 2017-02-13 RX ADMIN — INSULIN LISPRO 8 UNITS: 100 INJECTION, SOLUTION INTRAVENOUS; SUBCUTANEOUS at 12:49

## 2017-02-13 RX ADMIN — POLYETHYLENE GLYCOL 3350 17 G: 17 POWDER, FOR SOLUTION ORAL at 08:58

## 2017-02-13 RX ADMIN — HEPARIN SODIUM 5000 UNITS: 5000 INJECTION, SOLUTION INTRAVENOUS; SUBCUTANEOUS at 06:05

## 2017-02-13 RX ADMIN — INSULIN GLARGINE 25 UNITS: 100 INJECTION, SOLUTION SUBCUTANEOUS at 21:25

## 2017-02-13 RX ADMIN — INSULIN LISPRO 6 UNITS: 100 INJECTION, SOLUTION INTRAVENOUS; SUBCUTANEOUS at 08:58

## 2017-02-13 RX ADMIN — HEPARIN SODIUM 5000 UNITS: 5000 INJECTION, SOLUTION INTRAVENOUS; SUBCUTANEOUS at 14:57

## 2017-02-14 ENCOUNTER — APPOINTMENT (INPATIENT)
Dept: NUCLEAR MEDICINE | Facility: HOSPITAL | Age: 62
DRG: 206 | End: 2017-02-14
Payer: COMMERCIAL

## 2017-02-14 PROBLEM — E11.621 DIABETIC ULCER OF BOTH FEET ASSOCIATED WITH TYPE 2 DIABETES MELLITUS (HCC): Status: ACTIVE | Noted: 2017-02-08

## 2017-02-14 PROBLEM — J98.4 ACUTE PULMONARY INSUFFICIENCY: Status: ACTIVE | Noted: 2017-02-14

## 2017-02-14 PROBLEM — L97.519 DIABETIC ULCER OF BOTH FEET ASSOCIATED WITH TYPE 2 DIABETES MELLITUS (HCC): Status: ACTIVE | Noted: 2017-02-08

## 2017-02-14 PROBLEM — E66.9 OBESITY (BMI 35.0-39.9 WITHOUT COMORBIDITY): Status: ACTIVE | Noted: 2017-02-14

## 2017-02-14 PROBLEM — L97.529 DIABETIC ULCER OF BOTH FEET ASSOCIATED WITH TYPE 2 DIABETES MELLITUS (HCC): Status: ACTIVE | Noted: 2017-02-08

## 2017-02-14 LAB
ANION GAP SERPL CALCULATED.3IONS-SCNC: 7 MMOL/L (ref 4–13)
BUN SERPL-MCNC: 9 MG/DL (ref 5–25)
CALCIUM SERPL-MCNC: 8.5 MG/DL (ref 8.3–10.1)
CHLORIDE SERPL-SCNC: 100 MMOL/L (ref 100–108)
CO2 SERPL-SCNC: 26 MMOL/L (ref 21–32)
CREAT SERPL-MCNC: 0.97 MG/DL (ref 0.6–1.3)
ERYTHROCYTE [DISTWIDTH] IN BLOOD BY AUTOMATED COUNT: 14.3 % (ref 11.6–15.1)
GFR SERPL CREATININE-BSD FRML MDRD: >60 ML/MIN/1.73SQ M
GLUCOSE SERPL-MCNC: 282 MG/DL (ref 65–140)
GLUCOSE SERPL-MCNC: 292 MG/DL (ref 65–140)
GLUCOSE SERPL-MCNC: 331 MG/DL (ref 65–140)
GLUCOSE SERPL-MCNC: 331 MG/DL (ref 65–140)
GLUCOSE SERPL-MCNC: 380 MG/DL (ref 65–140)
HCT VFR BLD AUTO: 32.4 % (ref 36.5–49.3)
HGB BLD-MCNC: 10.3 G/DL (ref 12–17)
MCH RBC QN AUTO: 25.9 PG (ref 26.8–34.3)
MCHC RBC AUTO-ENTMCNC: 31.8 G/DL (ref 31.4–37.4)
MCV RBC AUTO: 82 FL (ref 82–98)
PLATELET # BLD AUTO: 298 THOUSANDS/UL (ref 149–390)
PMV BLD AUTO: 11.3 FL (ref 8.9–12.7)
POTASSIUM SERPL-SCNC: 4.3 MMOL/L (ref 3.5–5.3)
RBC # BLD AUTO: 3.97 MILLION/UL (ref 3.88–5.62)
SODIUM SERPL-SCNC: 133 MMOL/L (ref 136–145)
WBC # BLD AUTO: 6 THOUSAND/UL (ref 4.31–10.16)

## 2017-02-14 PROCEDURE — A9541 TC99M SULFUR COLLOID: HCPCS

## 2017-02-14 PROCEDURE — A9270 NON-COVERED ITEM OR SERVICE: HCPCS | Performed by: NURSE PRACTITIONER

## 2017-02-14 PROCEDURE — 85027 COMPLETE CBC AUTOMATED: CPT | Performed by: FAMILY MEDICINE

## 2017-02-14 PROCEDURE — 78264 GASTRIC EMPTYING IMG STUDY: CPT

## 2017-02-14 PROCEDURE — 82948 REAGENT STRIP/BLOOD GLUCOSE: CPT

## 2017-02-14 PROCEDURE — 80048 BASIC METABOLIC PNL TOTAL CA: CPT | Performed by: FAMILY MEDICINE

## 2017-02-14 RX ADMIN — INSULIN LISPRO 4 UNITS: 100 INJECTION, SOLUTION INTRAVENOUS; SUBCUTANEOUS at 22:06

## 2017-02-14 RX ADMIN — HEPARIN SODIUM 5000 UNITS: 5000 INJECTION, SOLUTION INTRAVENOUS; SUBCUTANEOUS at 15:26

## 2017-02-14 RX ADMIN — PANTOPRAZOLE SODIUM 40 MG: 40 TABLET, DELAYED RELEASE ORAL at 17:55

## 2017-02-14 RX ADMIN — CEFTRIAXONE 1000 MG: 1 INJECTION, POWDER, FOR SOLUTION INTRAMUSCULAR; INTRAVENOUS at 07:33

## 2017-02-14 RX ADMIN — INSULIN LISPRO 8 UNITS: 100 INJECTION, SOLUTION INTRAVENOUS; SUBCUTANEOUS at 17:55

## 2017-02-14 RX ADMIN — INSULIN LISPRO 10 UNITS: 100 INJECTION, SOLUTION INTRAVENOUS; SUBCUTANEOUS at 12:32

## 2017-02-14 RX ADMIN — HEPARIN SODIUM 5000 UNITS: 5000 INJECTION, SOLUTION INTRAVENOUS; SUBCUTANEOUS at 06:50

## 2017-02-14 RX ADMIN — INSULIN GLARGINE 25 UNITS: 100 INJECTION, SOLUTION SUBCUTANEOUS at 22:06

## 2017-02-14 RX ADMIN — HEPARIN SODIUM 5000 UNITS: 5000 INJECTION, SOLUTION INTRAVENOUS; SUBCUTANEOUS at 22:06

## 2017-02-14 RX ADMIN — SODIUM CHLORIDE 50 ML/HR: 0.9 INJECTION, SOLUTION INTRAVENOUS at 06:46

## 2017-02-15 VITALS
BODY MASS INDEX: 27.55 KG/M2 | SYSTOLIC BLOOD PRESSURE: 146 MMHG | RESPIRATION RATE: 18 BRPM | DIASTOLIC BLOOD PRESSURE: 69 MMHG | HEART RATE: 81 BPM | TEMPERATURE: 98 F | HEIGHT: 75 IN | WEIGHT: 221.56 LBS | OXYGEN SATURATION: 96 %

## 2017-02-15 LAB — GLUCOSE SERPL-MCNC: 338 MG/DL (ref 65–140)

## 2017-02-15 PROCEDURE — A9270 NON-COVERED ITEM OR SERVICE: HCPCS | Performed by: INTERNAL MEDICINE

## 2017-02-15 PROCEDURE — 97110 THERAPEUTIC EXERCISES: CPT

## 2017-02-15 PROCEDURE — 82948 REAGENT STRIP/BLOOD GLUCOSE: CPT

## 2017-02-15 PROCEDURE — 97116 GAIT TRAINING THERAPY: CPT

## 2017-02-15 PROCEDURE — A9270 NON-COVERED ITEM OR SERVICE: HCPCS | Performed by: NURSE PRACTITIONER

## 2017-02-15 RX ORDER — AMOXICILLIN AND CLAVULANATE POTASSIUM 875; 125 MG/1; MG/1
1 TABLET, FILM COATED ORAL EVERY 12 HOURS SCHEDULED
Status: DISCONTINUED | OUTPATIENT
Start: 2017-02-15 | End: 2017-02-15 | Stop reason: HOSPADM

## 2017-02-15 RX ORDER — AMOXICILLIN AND CLAVULANATE POTASSIUM 875; 125 MG/1; MG/1
1 TABLET, FILM COATED ORAL EVERY 12 HOURS SCHEDULED
Qty: 20 TABLET | Refills: 0 | Status: SHIPPED | OUTPATIENT
Start: 2017-02-15 | End: 2017-02-25

## 2017-02-15 RX ORDER — PANTOPRAZOLE SODIUM 40 MG/1
40 TABLET, DELAYED RELEASE ORAL DAILY
Qty: 30 TABLET | Refills: 2 | Status: SHIPPED | OUTPATIENT
Start: 2017-02-15 | End: 2017-03-17

## 2017-02-15 RX ADMIN — PANTOPRAZOLE SODIUM 40 MG: 40 TABLET, DELAYED RELEASE ORAL at 06:23

## 2017-02-15 RX ADMIN — AMOXICILLIN AND CLAVULANATE POTASSIUM 1 TABLET: 875; 125 TABLET, FILM COATED ORAL at 09:19

## 2017-02-15 RX ADMIN — INSULIN LISPRO 8 UNITS: 100 INJECTION, SOLUTION INTRAVENOUS; SUBCUTANEOUS at 09:21

## 2017-02-15 RX ADMIN — HEPARIN SODIUM 5000 UNITS: 5000 INJECTION, SOLUTION INTRAVENOUS; SUBCUTANEOUS at 06:23

## 2017-02-16 LAB — BACTERIA BLD CULT: NORMAL

## 2023-04-02 PROBLEM — R79.89 ELEVATED SERUM CREATININE: Status: ACTIVE | Noted: 2023-04-02

## 2023-04-02 PROBLEM — A41.9 SEPSIS (HCC): Status: ACTIVE | Noted: 2023-04-02

## 2023-04-02 PROBLEM — Z16.24 MULTIPLE DRUG RESISTANT ORGANISM (MDRO) CULTURE POSITIVE: Status: ACTIVE | Noted: 2023-04-02

## 2023-04-04 PROBLEM — M86.9 OSTEOMYELITIS (HCC): Status: ACTIVE | Noted: 2023-04-04

## 2023-04-04 PROBLEM — L97.519 DIABETIC ULCER OF BOTH FEET ASSOCIATED WITH TYPE 2 DIABETES MELLITUS (HCC): Status: RESOLVED | Noted: 2017-02-08 | Resolved: 2023-04-04

## 2023-04-04 PROBLEM — R33.9 URINARY RETENTION: Status: ACTIVE | Noted: 2023-04-04

## 2023-04-04 PROBLEM — E11.621 DIABETIC ULCER OF BOTH FEET ASSOCIATED WITH TYPE 2 DIABETES MELLITUS (HCC): Status: RESOLVED | Noted: 2017-02-08 | Resolved: 2023-04-04

## 2023-04-04 PROBLEM — L97.529 DIABETIC ULCER OF BOTH FEET ASSOCIATED WITH TYPE 2 DIABETES MELLITUS (HCC): Status: RESOLVED | Noted: 2017-02-08 | Resolved: 2023-04-04

## 2023-04-05 ENCOUNTER — TELEPHONE (OUTPATIENT)
Dept: OTHER | Facility: HOSPITAL | Age: 68
End: 2023-04-05

## 2023-04-05 NOTE — TELEPHONE ENCOUNTER
Brigido Arndt is a 70 yo history of left testicular mass, status post left orchiectomy with negative pathology, recurrent UTI  Patient seen in urologic consultation due to UTI  PVR is negligible  Christiano Kerns in process of transitioning from [place] to [place] and would like to establish care  Please schedule patient for BPH, recurrent UTI follow up nonurgent  Thanks!

## 2023-04-08 PROBLEM — D62 ACUTE ON CHRONIC BLOOD LOSS ANEMIA: Status: ACTIVE | Noted: 2017-02-08

## 2023-04-10 ENCOUNTER — TELEPHONE (OUTPATIENT)
Dept: CARDIOLOGY CLINIC | Facility: CLINIC | Age: 68
End: 2023-04-10

## 2023-04-10 PROBLEM — A41.9 SEPSIS (HCC): Status: RESOLVED | Noted: 2023-04-02 | Resolved: 2023-04-10

## 2023-04-10 PROBLEM — R79.89 ELEVATED SERUM CREATININE: Status: RESOLVED | Noted: 2023-04-02 | Resolved: 2023-04-10

## 2023-04-10 PROBLEM — D62 ACUTE ON CHRONIC BLOOD LOSS ANEMIA: Status: RESOLVED | Noted: 2017-02-08 | Resolved: 2023-04-10

## 2023-04-10 NOTE — TELEPHONE ENCOUNTER
----- Message from Damien Morris, [facility] sent at 4/7/2023  4:29 PM EDT -----  Regarding: Hospital Follow-up  Cardiology Follow-up:    Patient clinical visit in 2 month at the [address] [place] [address] location: [facility] office. .    Schedule visit with Cardio Riaz Providers: Guillermina Goodrich or first available provider. Type of Visit: VISIT TYPE: in-person office visit. Test ordered:      Additional Details:

## 2023-04-11 PROBLEM — D62 ACUTE ON CHRONIC BLOOD LOSS ANEMIA: Status: RESOLVED | Noted: 2017-02-08 | Resolved: 2023-04-11

## 2023-04-11 PROBLEM — M86.9 OSTEOMYELITIS (HCC): Status: RESOLVED | Noted: 2023-04-04 | Resolved: 2023-04-11

## 2023-04-11 PROBLEM — R33.9 URINARY RETENTION: Status: RESOLVED | Noted: 2023-04-04 | Resolved: 2023-04-11

## 2023-04-21 NOTE — TELEPHONE ENCOUNTER
Pt called back & he is still in rehab and will be leaving in Saturday. Pt said that he will give us a call back next week to schedule appt.

## 2023-05-15 ENCOUNTER — OFFICE VISIT (OUTPATIENT)
Dept: VASCULAR SURGERY | Facility: CLINIC | Age: 68
End: 2023-05-15

## 2023-05-15 VITALS
BODY MASS INDEX: 28.93 KG/M2 | HEIGHT: 75 IN | HEART RATE: 78 BPM | SYSTOLIC BLOOD PRESSURE: 130 MMHG | DIASTOLIC BLOOD PRESSURE: 64 MMHG

## 2023-05-15 DIAGNOSIS — Z89.512 HX OF BKA, LEFT (HCC): Primary | ICD-10-CM

## 2023-05-15 DIAGNOSIS — I73.9 PVD (PERIPHERAL VASCULAR DISEASE) (HCC): ICD-10-CM

## 2023-05-15 DIAGNOSIS — N18.30 STAGE 3 CHRONIC KIDNEY DISEASE, UNSPECIFIED WHETHER STAGE 3A OR 3B CKD (HCC): ICD-10-CM

## 2023-05-15 RX ORDER — INSULIN LISPRO 200 [IU]/ML
INJECTION, SOLUTION SUBCUTANEOUS
COMMUNITY
Start: 2023-04-20

## 2023-05-15 RX ORDER — OXYCODONE HYDROCHLORIDE 5 MG/1
TABLET ORAL
COMMUNITY
Start: 2023-04-20

## 2023-05-15 NOTE — PATIENT INSTRUCTIONS
Left BKA healing well  Ok to use stump  now  Ok to start prosthetics fitting now  Will need surveillance of right leg arterial disease   RTC 1 year with ultrasound

## 2023-05-15 NOTE — ASSESSMENT & PLAN NOTE
Left leg PAD s/p infra-inguinal intervention and left BKA 4/2023  BKA healing well  Staples removed today  Soumya Lai for Long and prosthetics fitting at this time  Already connected with [facility]    BARRETT 0 9 on the right  Currently asymptomatic  LEAD 1 year   RTC 1 year

## 2023-05-15 NOTE — PROGRESS NOTES
Assessment/Plan:    PVD (peripheral vascular disease) ([facility] )  Left leg PAD s/p infra-inguinal intervention and left BKA 4/2023  BKA healing well  Staples removed today  Maggie Hernandez for Long and prosthetics fitting at this time  Already connected with [facility]    BARRETT 0 9 on the right  Currently asymptomatic  LEAD 1 year   RTC 1 year       Diagnoses and all orders for this visit:    Hx of BKA, left ([facility] )  -     Limb     PVD (peripheral vascular disease) ([facility] )  -     VAS lower limb arterial duplex, complete bilateral; Future    Stage 3 chronic kidney disease, unspecified whether stage 3a or 3b CKD ([facility] )    Other orders  -     oxyCODONE (ROXICODONE) 5 immediate release tablet; TAKE 1 TABLET BY MOUTH EVERY 4 HOURS AS NEEDED FOR MODERATE PAIN (PAIN SCORE 4-6) MAX DAILY AMOUNT 30MG  -     HumaLOG KwikPen 200 units/mL CONCENTRATED U-200 injection pen          Subjective:      Patient ID: Dian Andrade is a 69 y o  male  Patient is s/p L BKA on 4/6/23 and presents today for post-op visit  HPI    The following portions of the patient's history were reviewed and updated as appropriate: allergies, current medications, past family history, past medical history, past social history, past surgical history and problem list     Had left BKA by myself 6 weeks ago for leg heel wound  Doing well since that time  Denies right leg pain or LE wounds  Denies wound healing issues  Has already established care with [place]  Review of Systems   Constitutional: Positive for activity change  HENT: Negative  Eyes: Negative  Respiratory: Negative  Cardiovascular: Negative  Gastrointestinal: Negative  Endocrine: Negative  Genitourinary: Negative  Musculoskeletal: Positive for gait problem  Skin: Negative  Allergic/Immunologic: Positive for environmental allergies  Hematological: Negative  Psychiatric/Behavioral: Negative  I have reviewed the ROS above and made changes as needed  "      Objective:      /64 (BP Location: Left arm, Patient Position: Sitting)   Pulse 78   Ht 6' 3\" (1 905 m)   BMI 28 93 kg/m²          Physical Exam      General  Exam: alert, awake, oriented, no distress, consistent with stated age    Integumentary  Exam: warm, dry, no gross lesions, no bruises and normal color    Head and Neck  Exam: supple, no bruits, trachea midline, no JVD, no mass or palpable nodes    Eye  Exam: extraoccular movements intact, no scleral icterus, sclera clear, pupils equal round and reactive to light    ENMT  Exam: oral mucosa pink and moist    Chest and Lung  Exam: chest normal without deformity, bilaterally expansive, clear to auscultation    Cardiovascular  Exam: regular rate, regular rhythm, no murmurs, no rubs or gallops    Adbomen  Exam: soft, non-tender, non-distended, no pulsatile abdominal masses, no abdominal bruit    Peripheral Vascular  Exam: no clubbing of the digits of the upper extremity, no cyanosis, no edema, radial pulses 2+ bilaterally, skin well perfused, without and no varicosities      No widened popliteal pulse noted bilaterally    Upper Extremity:  Palpation: Radial pulse- Bilateral 2+    Lower Extremity:  Palpation: Femoral pulse- Bilateral 2+           Left BKA healed, stump soft    Right distal pulses non-palpable    No RLE wounds  Neurologic  Exam:alert, non-focal, oriented x 3, cranial nerves II-XII grossly intact                        "

## 2023-06-08 ENCOUNTER — OFFICE VISIT (OUTPATIENT)
Dept: UROLOGY | Facility: CLINIC | Age: 68
End: 2023-06-08
Payer: MEDICARE

## 2023-06-08 VITALS
HEIGHT: 75 IN | DIASTOLIC BLOOD PRESSURE: 80 MMHG | BODY MASS INDEX: 28.93 KG/M2 | HEART RATE: 79 BPM | SYSTOLIC BLOOD PRESSURE: 132 MMHG | OXYGEN SATURATION: 99 %

## 2023-06-08 DIAGNOSIS — N13.8 BPH WITH OBSTRUCTION/LOWER URINARY TRACT SYMPTOMS: ICD-10-CM

## 2023-06-08 DIAGNOSIS — N40.1 BPH WITH OBSTRUCTION/LOWER URINARY TRACT SYMPTOMS: ICD-10-CM

## 2023-06-08 DIAGNOSIS — N39.0 RECURRENT UTI: Primary | ICD-10-CM

## 2023-06-08 LAB
SL AMB  POCT GLUCOSE, UA: NORMAL
SL AMB LEUKOCYTE ESTERASE,UA: NORMAL
SL AMB POCT BILIRUBIN,UA: NORMAL
SL AMB POCT BLOOD,UA: NORMAL
SL AMB POCT CLARITY,UA: NORMAL
SL AMB POCT COLOR,UA: YELLOW
SL AMB POCT KETONES,UA: NORMAL
SL AMB POCT NITRITE,UA: NORMAL
SL AMB POCT PH,UA: 5
SL AMB POCT SPECIFIC GRAVITY,UA: 1.02
SL AMB POCT URINE PROTEIN: NORMAL
SL AMB POCT UROBILINOGEN: 0.2

## 2023-06-08 PROCEDURE — 87186 SC STD MICRODIL/AGAR DIL: CPT | Performed by: PHYSICIAN ASSISTANT

## 2023-06-08 PROCEDURE — 87181 SC STD AGAR DILUTION PER AGT: CPT | Performed by: PHYSICIAN ASSISTANT

## 2023-06-08 PROCEDURE — 81002 URINALYSIS NONAUTO W/O SCOPE: CPT | Performed by: PHYSICIAN ASSISTANT

## 2023-06-08 PROCEDURE — 87077 CULTURE AEROBIC IDENTIFY: CPT | Performed by: PHYSICIAN ASSISTANT

## 2023-06-08 PROCEDURE — 99213 OFFICE O/P EST LOW 20 MIN: CPT | Performed by: PHYSICIAN ASSISTANT

## 2023-06-08 PROCEDURE — 87086 URINE CULTURE/COLONY COUNT: CPT | Performed by: PHYSICIAN ASSISTANT

## 2023-06-08 RX ORDER — ALFUZOSIN HYDROCHLORIDE 10 MG/1
10 TABLET, EXTENDED RELEASE ORAL DAILY
Qty: 90 TABLET | Refills: 3 | Status: SHIPPED | OUTPATIENT
Start: 2023-06-08

## 2023-06-08 RX ORDER — NITROFURANTOIN 25; 75 MG/1; MG/1
100 CAPSULE ORAL 2 TIMES DAILY
Qty: 14 CAPSULE | Refills: 0 | Status: SHIPPED | OUTPATIENT
Start: 2023-06-08 | End: 2023-06-15

## 2023-06-08 NOTE — PROGRESS NOTES
UROLOGY PROGRESS NOTE   Patient Identifiers: Renuka Clement (MRN [id number])  Date of Service: 6/8/2023    Subjective:   58-year-old man with history of recurrent urinary tract infection  He was managed by previous outside urologist for a left testicular mass and an orchiectomy  His A1c at the time of consultation was 10  He complains of recurrent urinary tract infection  Urine culture grew ESBL E  coli  CT abdomen pelvis showed perivesical inflammatory changes but no other abnormal findings  Prostate is enlarged  He has been on finasteride      Reason for visit: Recurrent UTI follow-up    Objective:     VITALS:    Vitals:    06/08/23 1120   BP: 132/80   Pulse: 79   SpO2: 99%     AUA SYMPTOM SCORE    Flowsheet Row Most Recent Value   AUA SYMPTOM SCORE    How often have you had a sensation of not emptying your bladder completely after you finished urinating? 2 (P)     How often have you had to urinate again less than two hours after you finished urinating? 3 (P)     How often have you found you stopped and started again several times when you urinate? 2 (P)     How often have you found it difficult to postpone urination? 5 (P)     How often have you had a weak urinary stream? 2 (P)     How often have you had to push or strain to begin urination? 0 (P)     How many times did you most typically get up to urinate from the time you went to bed at night until the time you got up in the morning? 5 (P)     Quality of Life: If you were to spend the rest of your life with your urinary condition just the way it is now, how would you feel about that? 4 (P)     AUA SYMPTOM SCORE 19 (P)             LABS:  Lab Results   Component Value Date    HCT 25 9 (L) 04/11/2023    HGB 7 8 (L) 04/11/2023     04/10/2023    WBC 8 03 04/10/2023   ]    Lab Results   Component Value Date    BUN 20 04/11/2023    CALCIUM 9 1 04/11/2023     04/11/2023    CO2 26 04/11/2023    CREATININE 1 28 04/11/2023    K 4 7 04/11/2023 ]        INPATIENT MEDS:    Current Outpatient Medications:   •  alfuzosin (UROXATRAL) 10 mg 24 hr tablet, Take 1 tablet (10 mg total) by mouth daily, Disp: 90 tablet, Rfl: 3  •  amLODIPine (NORVASC) 5 mg tablet, Take 5 mg by mouth daily, Disp: , Rfl:   •  atorvastatin (LIPITOR) 20 mg tablet, Take 20 mg by mouth daily, Disp: , Rfl:   •  benzonatate (TESSALON PERLES) 100 mg capsule, Take 100 mg by mouth 3 (three) times a day as needed for cough, Disp: , Rfl:   •  clopidogrel (PLAVIX) 75 mg tablet, Take 75 mg by mouth daily, Disp: , Rfl:   •  finasteride (PROSCAR) 5 mg tablet, Take 1 tablet (5 mg total) by mouth daily, Disp: 30 tablet, Rfl: 1  •  gabapentin (NEURONTIN) 300 mg capsule, Take 300 mg by mouth 3 (three) times a day, Disp: , Rfl:   •  HumaLOG KwikPen 200 units/mL CONCENTRATED U-200 injection pen, , Disp: , Rfl:   •  insulin degludec [place] FlexTouch) 200 units/mL CONCENTRATED U-200 injection pen, Inject 25 Units under the skin daily at bedtime, Disp: 9 mL, Rfl: 0  •  insulin lispro (HumaLOG) 100 units/mL injection, Inject 8 Units under the skin 3 (three) times a day with meals, Disp: , Rfl:   •  insulin lispro (HumaLOG) 100 units/mL injection, Inject under the skin 3 (three) times a day with meals Blood Glucose 150 - 224: 1 Unit of Insulin Blood Glucose 225 - 299: 2 Units of Insulin Blood Glucose 300 - 374: 3 Units of Insulin Blood Glucose 375 - 449: 4 Units of Insulin Blood Glucose greater than or equal to 450: 5 Units of Insulin, Disp: , Rfl:   •  insulin NPH (HumuLIN N,NovoLIN N) 100 Units/mL subcutaneous injection, Inject 35 Units under the skin 2 (two) times a day before meals, Disp: , Rfl: 0  •  metFORMIN (GLUCOPHAGE) 500 mg tablet, Take 500 mg by mouth 2 (two) times a day with meals, Disp: , Rfl:   •  nitrofurantoin (MACROBID) 100 mg capsule, Take 1 capsule (100 mg total) by mouth 2 (two) times a day for 7 days, Disp: 14 capsule, Rfl: 0  •  oxyCODONE (ROXICODONE) 5 immediate release tablet, TAKE "1 TABLET BY MOUTH EVERY 4 HOURS AS NEEDED FOR MODERATE PAIN (PAIN SCORE 4-6) MAX DAILY AMOUNT 30MG, Disp: , Rfl:   •  albuterol (PROVENTIL HFA,VENTOLIN HFA) 90 mcg/act inhaler, Inhale 2 puffs every 4 (four) hours as needed for wheezing or shortness of breath (coughing spells) (Patient not taking: Reported on 5/15/2023), Disp: 1 Inhaler, Rfl: 0  •  pantoprazole (PROTONIX) 40 mg tablet, Take 1 tablet by mouth daily for 30 days, Disp: 30 tablet, Rfl: 2      Physical Exam:   /80   Pulse 79   Ht 6' 3\" (1 905 m)   SpO2 99%   BMI 28 93 kg/m²   GEN: no acute distress    RESP: breathing comfortably with no accessory muscle use    ABD: soft, non-tender, non-distended   INCISION:    EXT: no significant peripheral edema       RADIOLOGY:   CT ABDOMEN AND PELVIS WITH IV CONTRAST   IMPRESSION:     No evidence for pelvic abscess  Mild perivesical inflammatory changes, similar to prior, possibly due to cystitis; correlate with urinalysis  Prostatomegaly  Stable splenomegaly measuring up to 16 cm  Assessment:   #1  Recurrent urinary tract infection  #2  Poorly controlled diabetes  #3    Left BKA    Plan:   -I added tamsulosin to his regime  -Urine sent out for culture I put him on Macrobid which had good sensitivity  -We will follow-up in about 3 months  -If no improvement would recommend cystoscopy in the office          "

## 2023-06-11 LAB — BACTERIA UR CULT: ABNORMAL

## 2023-09-06 NOTE — PROGRESS NOTES
9/8/2023      Chief Complaint   Patient presents with   • Recurrent UTI's         Assessment and Plan    1. Recurrent UTIs  - No positive urine cultures since last visit  - Recommend good glycemic control.   - Recommend increased hydration, avoiding constipation, cranberry supplementation and probiotics for UTI prevention  - Currently having some increased LUTS and urine dip today concerning for infection. Will start abx given the weekend. Sent out for UA micro and culture. 2. BPH with LUTS  3. Urge urinary incontinence  - Ongoing urinary issues despite Flomax and finasteride dual therapy  - Reviewed work-up with cystoscopy and TRUS for further evaluation which he is interested in. He will also trial Detrol in the interim  - PVR=6 mL  - Recommend avoidance of bladder irritants. - F/u in 6-8 weeks for PVR assessment. 4. 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 Prostate cancer screening  - FH of prostate cancer in brother  - SEGUNDO benign  - Obtain updated PSA    History of Present Illness  Adrian Luna is a 69 y.o. male here for follow up evaluation of recurrent UTIs and BPH. He has not had UTI since last visit. Was started on Flomax in addition to finasteride at last visit. He reports ongoing sensation of incomplete bladder emptying at times, urinary urgency, frequency, and occasional urinary leakage. He denies any dysuria or hematuria. Review of Systems   Constitutional: Negative for chills and fever. Respiratory: Negative for shortness of breath. Cardiovascular: Negative for chest pain. Gastrointestinal: Negative for abdominal pain. Genitourinary: Positive for difficulty urinating, frequency and urgency. Negative for dysuria, flank pain and hematuria. Neurological: Negative for dizziness.            AUA SYMPTOM SCORE    Flowsheet Row Most Recent Value   AUA SYMPTOM SCORE    How often have you had a sensation of not emptying your bladder completely after you finished urinating? 3 (P)     How often have you had to urinate again less than two hours after you finished urinating? 5 (P)     How often have you found you stopped and started again several times when you urinate? 5 (P)     How often have you found it difficult to postpone urination? 5 (P)     How often have you had a weak urinary stream? 5 (P)     How often have you had to push or strain to begin urination? 0 (P)     How many times did you most typically get up to urinate from the time you went to bed at night until the time you got up in the morning? 5 (P)     Quality of Life: If you were to spend the rest of your life with your urinary condition just the way it is now, how would you feel about that? 5 (P)     AUA SYMPTOM SCORE 28 (P)              Past Medical History  Past Medical History:   Diagnosis Date   • BPH (benign prostatic hypertrophy) 10 /2020   • Chronic kidney disease 2020   • Diabetes mellitus ([facility])    • Foot ulcer due to secondary DM ([facility])    • Hypertension    • Urinary incontinence 2022   • Urinary tract infection 2023       Past Social History  Past Surgical History:   Procedure Laterality Date   • ESOPHAGOGASTRODUODENOSCOPY N/A 02/10/2017    Procedure: ESOPHAGOGASTRODUODENOSCOPY (EGD); Surgeon: Miranda Balbuena MD;  Location: [facility]; Service:    • JOINT REPLACEMENT     • LEG AMPUTATION THROUGH LOWER TIBIA AND FIBULA Left 2023    Procedure: AMPUTATION BELOW KNEE (BKA);   Surgeon: Camacho Bull DO;  Location: [facility];  Service: Vascular   • ORCHIECTOMY  2022    they thought It was cancer but turned out not to be     Social History     Tobacco Use   Smoking Status Former   • Packs/day: 1.50   • Years: 40.00   • Total pack years: 60.00   • Types: Cigarettes, Pipe   • Start date: 1969   • Quit date: 2009   • Years since quittin.6   • Passive exposure: Past   Smokeless Tobacco Never Tobacco Comments    already have       Past Family History  Family History   Problem Relation Age of Onset   • Heart attack Father    • Heart disease Father          of massive heart attack   • Cancer Mother          stomach cancer   • Diabetes Mother    • No Known Problems Brother    • No Known Problems Daughter        Past Social history  Social History     Socioeconomic History   • Marital status: /Civil Union     Spouse name: Not on file   • Number of children: Not on file   • Years of education: Not on file   • Highest education level: Not on file   Occupational History   • Not on file   Tobacco Use   • Smoking status: Former     Packs/day: 1.50     Years: 40.00     Total pack years: 60.00     Types: Cigarettes, Pipe     Start date: 1969     Quit date: 2009     Years since quittin.6     Passive exposure: Past   • Smokeless tobacco: Never   • Tobacco comments:     already have   Vaping Use   • Vaping Use: Never used   Substance and Sexual Activity   • Alcohol use: Not Currently   • Drug use: Never   • Sexual activity: Not Currently     Partners: Female     Birth control/protection: None   Other Topics Concern   • Not on file   Social History Narrative   • Not on file     Social Determinants of Health     Financial Resource Strain: Not on file   Food Insecurity: No Food Insecurity (2023)    Hunger Vital Sign    • Worried About Running Out of Food in the Last Year: Never true    • Ran Out of Food in the Last Year: Never true   Transportation Needs: No Transportation Needs (2023)    PRAPARE - Transportation    • Lack of Transportation (Medical): No    • Lack of Transportation (Non-Medical):  No   Physical Activity: Not on file   Stress: Not on file   Social Connections: Not on file   Intimate Partner Violence: Not on file   Housing Stability: Low Risk  (2023)    Housing Stability Vital Sign    • Unable to Pay for Housing in the Last Year: No    • Number of Places Lived in the Last Year: 1    • Unstable Housing in the Last Year: No       Current Medications  Current Outpatient Medications   Medication Sig Dispense Refill   • albuterol (PROVENTIL HFA,VENTOLIN HFA) 90 mcg/act inhaler Inhale 2 puffs every 4 (four) hours as needed for wheezing or shortness of breath (coughing spells) 1 Inhaler 0   • alfuzosin (UROXATRAL) 10 mg 24 hr tablet Take 1 tablet (10 mg total) by mouth daily 90 tablet 3   • amLODIPine (NORVASC) 5 mg tablet Take 5 mg by mouth daily     • atorvastatin (LIPITOR) 20 mg tablet Take 20 mg by mouth daily     • benzonatate (TESSALON PERLES) 100 mg capsule Take 100 mg by mouth 3 (three) times a day as needed for cough     • clopidogrel (PLAVIX) 75 mg tablet Take 75 mg by mouth daily     • finasteride (PROSCAR) 5 mg tablet Take 1 tablet (5 mg total) by mouth daily 30 tablet 1   • gabapentin (NEURONTIN) 300 mg capsule Take 300 mg by mouth 3 (three) times a day     • Glucagon (Gvoke HypoPen 2-Pack) 1 MG/0.2ML SOAJ Inject 1 mg under the skin     • insulin degludec [place] FlexTouch) 200 units/mL CONCENTRATED U-200 injection pen Inject 25 Units under the skin daily at bedtime (Patient taking differently: Inject 36 Units under the skin daily at bedtime) 9 mL 0   • insulin NPH (HumuLIN N,NovoLIN N) 100 Units/mL subcutaneous injection Inject 35 Units under the skin 2 (two) times a day before meals (Patient taking differently: Inject 8 Units under the skin 2 (two) times a day before meals)  0   • lisinopril (ZESTRIL) 2.5 mg tablet every 24 hours     • meloxicam (MOBIC) 7.5 mg tablet Take 7.5 mg by mouth daily     • metFORMIN (GLUCOPHAGE-XR) 500 mg 24 hr tablet      • pantoprazole (PROTONIX) 40 mg tablet Take 1 tablet by mouth daily for 30 days 30 tablet 2   • pravastatin (PRAVACHOL) 20 mg tablet 1 tab(s) orally once a day (at bedtime) for 90 days     • insulin regular (NovoLIN R) 100 units/mL injection per scale subcutaneously (Patient not taking: Reported on 9/8/2023)     • metFORMIN (GLUCOPHAGE) 500 mg tablet Take 500 mg by mouth 2 (two) times a day with meals (Patient not taking: Reported on 9/8/2023)     • oxyCODONE (ROXICODONE) 5 immediate release tablet TAKE 1 TABLET BY MOUTH EVERY 4 HOURS AS NEEDED FOR MODERATE PAIN (PAIN SCORE 4-6) MAX DAILY AMOUNT 30MG (Patient not taking: Reported on 9/8/2023)       No current facility-administered medications for this visit. Allergies  Allergies   Allergen Reactions   • Liraglutide GI Intolerance     Severe acid reflux   • Iodinated Contrast Media Other (See Comments)     "kidney problems"   • Lantus [Insulin Glargine] Hives   • Sulfa Antibiotics Rash         The following portions of the patient's history were reviewed and updated as appropriate: allergies, current medications, past medical history, past social history, past surgical history and problem list.      Vitals  Vitals:    09/08/23 0747   BP: 128/70   Pulse: 71   SpO2: 100%   Weight: 99.8 kg (220 lb 1.3 oz)   Height: 6' 3" (1.905 m)           Physical Exam  Physical Exam  Constitutional:       Appearance: Normal appearance. HENT:      Head: Normocephalic and atraumatic. Right Ear: External ear normal.      Left Ear: External ear normal.      Nose: Nose normal.   Eyes:      General: No scleral icterus. Conjunctiva/sclera: Conjunctivae normal.   Cardiovascular:      Pulses: Normal pulses. Pulmonary:      Effort: Pulmonary effort is normal.   Genitourinary:     Comments: Prostate approx 35 g without nodules or tenderness  Musculoskeletal:      Cervical back: Normal range of motion. Comments: Ambulating with cane   Skin:     General: Skin is warm and dry. Neurological:      General: No focal deficit present. Mental Status: He is alert and oriented to person, place, and time. Psychiatric:         Mood and Affect: Mood normal.         Behavior: Behavior normal.         Thought Content:  Thought content normal.         Judgment: Judgment normal. Results  Recent Results (from the past 1 hour(s))   POCT urine dip    Collection Time: 09/08/23  8:04 AM   Result Value Ref Range    LEUKOCYTE ESTERASE,UA ++     NITRITE,UA +     SL AMB POCT UROBILINOGEN 0.2     POCT URINE PROTEIN trace      PH,UA 7.0     BLOOD,UA hemo trace     SPECIFIC GRAVITY,UA 1.010     KETONES,UA -     BILIRUBIN,UA -     GLUCOSE, UA -      COLOR,UA yellow     CLARITY,UA cloudy    POCT Measure PVR    Collection Time: 09/08/23  8:07 AM   Result Value Ref Range    POST-VOID RESIDUAL VOLUME, ML POC 6 mL   ]  No results found for: "PSA"  Lab Results   Component Value Date    CALCIUM 9.1 04/11/2023    K 4.7 04/11/2023    CO2 26 04/11/2023     04/11/2023    BUN 20 04/11/2023    CREATININE 1.28 04/11/2023     Lab Results   Component Value Date    WBC 8.03 04/10/2023    HGB 7.8 (L) 04/11/2023    HCT 25.9 (L) 04/11/2023    MCV 82 04/10/2023     04/10/2023           Orders  Orders Placed This Encounter   Procedures   • POCT urine dip   • POCT Measure PVR       Milka Crane

## 2023-09-08 ENCOUNTER — OFFICE VISIT (OUTPATIENT)
Dept: UROLOGY | Facility: CLINIC | Age: 68
End: 2023-09-08
Payer: MEDICARE

## 2023-09-08 VITALS
HEIGHT: 75 IN | WEIGHT: 220.08 LBS | OXYGEN SATURATION: 100 % | SYSTOLIC BLOOD PRESSURE: 128 MMHG | BODY MASS INDEX: 27.36 KG/M2 | DIASTOLIC BLOOD PRESSURE: 70 MMHG | HEART RATE: 71 BPM

## 2023-09-08 DIAGNOSIS — Z12.5 PROSTATE CANCER SCREENING: ICD-10-CM

## 2023-09-08 DIAGNOSIS — N39.41 URGE URINARY INCONTINENCE: ICD-10-CM

## 2023-09-08 DIAGNOSIS — N40.1 BPH WITH OBSTRUCTION/LOWER URINARY TRACT SYMPTOMS: ICD-10-CM

## 2023-09-08 DIAGNOSIS — N13.8 BPH WITH OBSTRUCTION/LOWER URINARY TRACT SYMPTOMS: ICD-10-CM

## 2023-09-08 DIAGNOSIS — N39.0 RECURRENT UTI: Primary | ICD-10-CM

## 2023-09-08 LAB
BACTERIA UR QL AUTO: ABNORMAL /HPF
BILIRUB UR QL STRIP: NEGATIVE
CLARITY UR: CLEAR
COLOR UR: ABNORMAL
GLUCOSE UR STRIP-MCNC: NEGATIVE MG/DL
HGB UR QL STRIP.AUTO: NEGATIVE
KETONES UR STRIP-MCNC: NEGATIVE MG/DL
LEUKOCYTE ESTERASE UR QL STRIP: ABNORMAL
NITRITE UR QL STRIP: POSITIVE
NON-SQ EPI CELLS URNS QL MICRO: ABNORMAL /HPF
PH UR STRIP.AUTO: 7 [PH]
POST-VOID RESIDUAL VOLUME, ML POC: 6 ML
PROT UR STRIP-MCNC: ABNORMAL MG/DL
RBC #/AREA URNS AUTO: ABNORMAL /HPF
SL AMB  POCT GLUCOSE, UA: NORMAL
SL AMB LEUKOCYTE ESTERASE,UA: NORMAL
SL AMB POCT BILIRUBIN,UA: NORMAL
SL AMB POCT BLOOD,UA: NORMAL
SL AMB POCT CLARITY,UA: NORMAL
SL AMB POCT COLOR,UA: YELLOW
SL AMB POCT KETONES,UA: NORMAL
SL AMB POCT NITRITE,UA: NORMAL
SL AMB POCT PH,UA: 7
SL AMB POCT SPECIFIC GRAVITY,UA: 1.01
SL AMB POCT URINE PROTEIN: NORMAL
SL AMB POCT UROBILINOGEN: 0.2
SP GR UR STRIP.AUTO: 1.02 (ref 1–1.03)
UROBILINOGEN UR STRIP-ACNC: <2 MG/DL
WBC #/AREA URNS AUTO: ABNORMAL /HPF
WBC CLUMPS # UR AUTO: PRESENT /UL

## 2023-09-08 PROCEDURE — 51798 US URINE CAPACITY MEASURE: CPT | Performed by: PHYSICIAN ASSISTANT

## 2023-09-08 PROCEDURE — 99214 OFFICE O/P EST MOD 30 MIN: CPT | Performed by: PHYSICIAN ASSISTANT

## 2023-09-08 PROCEDURE — 87077 CULTURE AEROBIC IDENTIFY: CPT | Performed by: PHYSICIAN ASSISTANT

## 2023-09-08 PROCEDURE — 87181 SC STD AGAR DILUTION PER AGT: CPT | Performed by: PHYSICIAN ASSISTANT

## 2023-09-08 PROCEDURE — 81002 URINALYSIS NONAUTO W/O SCOPE: CPT | Performed by: PHYSICIAN ASSISTANT

## 2023-09-08 PROCEDURE — 87186 SC STD MICRODIL/AGAR DIL: CPT | Performed by: PHYSICIAN ASSISTANT

## 2023-09-08 PROCEDURE — 81001 URINALYSIS AUTO W/SCOPE: CPT | Performed by: PHYSICIAN ASSISTANT

## 2023-09-08 PROCEDURE — 87086 URINE CULTURE/COLONY COUNT: CPT | Performed by: PHYSICIAN ASSISTANT

## 2023-09-08 RX ORDER — PRAVASTATIN SODIUM 20 MG
TABLET ORAL
COMMUNITY

## 2023-09-08 RX ORDER — TOLTERODINE 2 MG/1
2 CAPSULE, EXTENDED RELEASE ORAL DAILY
Qty: 30 CAPSULE | Refills: 2 | Status: SHIPPED | OUTPATIENT
Start: 2023-09-08

## 2023-09-08 RX ORDER — HUMAN INSULIN 100 [IU]/ML
INJECTION, SOLUTION SUBCUTANEOUS
COMMUNITY

## 2023-09-08 RX ORDER — METFORMIN HYDROCHLORIDE 500 MG/1
TABLET, EXTENDED RELEASE ORAL
COMMUNITY
Start: 2023-07-12

## 2023-09-08 RX ORDER — GLUCAGON INJECTION, SOLUTION 1 MG/.2ML
1 INJECTION, SOLUTION SUBCUTANEOUS
COMMUNITY
Start: 2023-07-05

## 2023-09-08 RX ORDER — MELOXICAM 7.5 MG/1
7.5 TABLET ORAL DAILY
COMMUNITY
Start: 2023-06-27

## 2023-09-08 RX ORDER — LISINOPRIL 2.5 MG/1
TABLET ORAL EVERY 24 HOURS
COMMUNITY

## 2023-09-08 RX ORDER — AMOXICILLIN AND CLAVULANATE POTASSIUM 500; 125 MG/1; MG/1
1 TABLET, FILM COATED ORAL EVERY 12 HOURS SCHEDULED
Qty: 14 TABLET | Refills: 0 | Status: SHIPPED | OUTPATIENT
Start: 2023-09-08 | End: 2023-09-11 | Stop reason: ALTCHOICE

## 2023-09-10 LAB — BACTERIA UR CULT: ABNORMAL

## 2023-09-11 ENCOUNTER — TELEPHONE (OUTPATIENT)
Dept: UROLOGY | Facility: CLINIC | Age: 68
End: 2023-09-11

## 2023-09-11 DIAGNOSIS — N39.0 RECURRENT UTI: Primary | ICD-10-CM

## 2023-09-11 LAB — BACTERIA UR CULT: ABNORMAL

## 2023-09-11 RX ORDER — NITROFURANTOIN 25; 75 MG/1; MG/1
100 CAPSULE ORAL 2 TIMES DAILY
Qty: 10 CAPSULE | Refills: 0 | Status: SHIPPED | OUTPATIENT
Start: 2023-09-11 | End: 2023-09-16

## 2023-09-11 NOTE — TELEPHONE ENCOUNTER
Per CC, detailed message left for patient advising results and antibiotic change. Urology number provided should he have questions.

## 2023-09-11 NOTE — TELEPHONE ENCOUNTER
----- Message from Ibeth Spencer PA-C sent at 9/11/2023 11:23 AM EDT -----  Based on final culture d/c Augmentin and start Macrobid, new abx sent to pharmacy

## 2023-10-11 ENCOUNTER — PROCEDURE VISIT (OUTPATIENT)
Dept: UROLOGY | Facility: CLINIC | Age: 68
End: 2023-10-11
Payer: MEDICARE

## 2023-10-11 VITALS
WEIGHT: 220 LBS | HEIGHT: 75 IN | DIASTOLIC BLOOD PRESSURE: 78 MMHG | SYSTOLIC BLOOD PRESSURE: 142 MMHG | BODY MASS INDEX: 27.35 KG/M2

## 2023-10-11 DIAGNOSIS — N13.8 BPH WITH OBSTRUCTION/LOWER URINARY TRACT SYMPTOMS: Primary | ICD-10-CM

## 2023-10-11 DIAGNOSIS — N40.1 BPH WITH OBSTRUCTION/LOWER URINARY TRACT SYMPTOMS: Primary | ICD-10-CM

## 2023-10-11 PROCEDURE — 52000 CYSTOURETHROSCOPY: CPT | Performed by: UROLOGY

## 2023-10-11 PROCEDURE — 87086 URINE CULTURE/COLONY COUNT: CPT | Performed by: UROLOGY

## 2023-10-11 RX ORDER — INSULIN LISPRO 100 [IU]/ML
INJECTION, SOLUTION INTRAVENOUS; SUBCUTANEOUS
COMMUNITY
Start: 2023-09-27

## 2023-10-11 RX ORDER — CEPHALEXIN 500 MG/1
500 CAPSULE ORAL 2 TIMES DAILY
COMMUNITY
Start: 2023-10-05

## 2023-10-11 NOTE — PATIENT INSTRUCTIONS
You had cystoscopy done in the office today. This means that we looked inside your urethra and bladder with a camera. You may see some blood in your urine for the next few days. This is normal. Please drink plenty of fluids. Call the office if you are passing large blood clots in your urine or if you are not able to urinate. It may burn when you urinate for the next few days. This is normal.    Please call the office if you have fevers or chills in the next few days. You will return to clinic on 10/26/23.

## 2023-10-11 NOTE — LETTER
October 11, 2023     Annemarie Yuen, [address] [place], [place] [id number]    Patient: Janell Martinez   YOB: 1955   Date of Visit: 10/11/2023       Dear Dr. Danika Cedeno:    Thank you for referring Janell Martinez to me for evaluation. Below are my notes for this consultation. If you have questions, please do not hesitate to call me. I look forward to following your patient along with you. Sincerely,        James De La Vega DO        CC: No Recipients    James De La Vega DO  10/11/2023 11:31 AM  Sign when Signing Visit     Cystoscopy     Date/Time  10/11/2023 10:30 AM     Performed by  James De La Vega DO   Authorized by  James De La Vega DO     Universal Protocol:  Consent: Verbal consent obtained. Written consent obtained. Risks and benefits: risks, benefits and alternatives were discussed  Consent given by: patient  Time out: Immediately prior to procedure a "time out" was called to verify the correct patient, procedure, equipment, support staff and site/side marked as required. Timeout called at: 10/11/2023 11:27 AM.  Patient understanding: patient states understanding of the procedure being performed  Patient consent: the patient's understanding of the procedure matches consent given  Procedure consent: procedure consent matches procedure scheduled  Relevant documents: relevant documents present and verified  Patient identity confirmed: verbally with patient      Procedure Details:  Procedure type: cystoscopy    Additional Procedure Details: Office Cystoscopy Procedure Note    Indication:     medically refractory lower urinary tract symptoms     Informed consent   The risks, benefits, complications, treatment options, and expected outcomes were discussed with the patient. The patient concurred with the proposed plan and provided informed consent.     Anesthesia  Lidocaine jelly 2%    Antibiotic prophylaxis   None    Procedure  The patient was placed in the supineposition, was prepped and draped in the usual manner using sterile technique, and 2% lidocaine jelly instilled into the urethra. A 17 F flexible cystoscope was then inserted into the urethra and the urethra and bladder carefully examined. The following findings were noted:    Findings:  Urethra:  Normal  Prostate:  bilateral lateral lobe hypertrophy with kissing lobes, mild to moderate median lobe without significant intravesical component, no lesions  Bladder:  No lesions, no trabeculations, no stones  Ureteral orifices:  orthotopic  Other findings:  None, retroflexed view confirms    Specimens: UCx                 Complications:    None; patient tolerated the procedure well          Once cystoscopy portion of procedure was over, I performed transrectal prostate ultrasound. Patient placed in lateral decubitus position with right side up. Transrectal ultrasound probe introduced into rectum with slow steady pressure. This was assisted by use of lubricant jelly. Once probe was introduced, prostate was scanned through in its entirety in both axial and sagittal views. No hypoechoic lesions were visualized in prostate. Bilateral seminal vesicles were visualized. No significant intravesical median lobe was visualized. Prostate was measured at 4.8 cm x 5.65 cm x 2.94 cm, volume 41.81 g. Key pictures were taken and saved to chart using software. Probe was slowly removed from rectum. Patient returned to supine position. Patient tolerated procedure well. Disposition: To home            Condition: Stable    Plan:   -taking keflex for diabetic ulcer. Told him to continue this as well for cystoscopy ppx. We will f/u ucx from today and call him if there are resistances.  -has f/u on 10/26 with Sheeba Caruso PA-C. He will discuss possible tx options at that time. I told him that he does have an obstructed channel and could possibly benefit from procedure if he so chooses.  If after his appt on 10/26 he wants to further discuss surgery, he can set up an office appointment with me.

## 2023-10-12 LAB — BACTERIA UR CULT: NORMAL

## 2023-10-13 ENCOUNTER — APPOINTMENT (EMERGENCY)
Dept: CT IMAGING | Facility: HOSPITAL | Age: 68
End: 2023-10-13
Payer: MEDICARE

## 2023-10-13 ENCOUNTER — APPOINTMENT (EMERGENCY)
Dept: RADIOLOGY | Facility: HOSPITAL | Age: 68
End: 2023-10-13
Payer: MEDICARE

## 2023-10-13 ENCOUNTER — HOSPITAL ENCOUNTER (INPATIENT)
Facility: HOSPITAL | Age: 68
LOS: 1 days | Discharge: HOME | End: 2023-10-15
Attending: EMERGENCY MEDICINE | Admitting: FAMILY MEDICINE
Payer: MEDICARE

## 2023-10-13 DIAGNOSIS — E11.621 TYPE 2 DIABETES MELLITUS WITH FOOT ULCER (HCC): ICD-10-CM

## 2023-10-13 DIAGNOSIS — S22.49XA MULTIPLE RIB FRACTURES: Primary | ICD-10-CM

## 2023-10-13 DIAGNOSIS — L97.509 TYPE 2 DIABETES MELLITUS WITH FOOT ULCER (HCC): ICD-10-CM

## 2023-10-13 PROBLEM — S22.42XA CLOSED FRACTURE OF MULTIPLE RIBS OF LEFT SIDE: Status: ACTIVE | Noted: 2023-10-13
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
## 2023-10-13 LAB
ALBUMIN SERPL BCP-MCNC: 4.1 G/DL (ref 3.5–5)
ALP SERPL-CCNC: 69 U/L (ref 34–104)
ALT SERPL W P-5'-P-CCNC: 11 U/L (ref 7–52)
ANION GAP SERPL CALCULATED.3IONS-SCNC: 6 MMOL/L
AST SERPL W P-5'-P-CCNC: 11 U/L (ref 13–39)
BILIRUB SERPL-MCNC: 0.4 MG/DL (ref 0.2–1)
BUN SERPL-MCNC: 22 MG/DL (ref 5–25)
CALCIUM SERPL-MCNC: 8.8 MG/DL (ref 8.4–10.2)
CHLORIDE SERPL-SCNC: 107 MMOL/L (ref 96–108)
CO2 SERPL-SCNC: 24 MMOL/L (ref 21–32)
CREAT SERPL-MCNC: 1.19 MG/DL (ref 0.6–1.3)
ERYTHROCYTE [DISTWIDTH] IN BLOOD BY AUTOMATED COUNT: 17.8 % (ref 11.6–15.1)
GFR SERPL CREATININE-BSD FRML MDRD: 61 ML/MIN/1.73SQ M
GLUCOSE SERPL-MCNC: 214 MG/DL (ref 65–140)
GLUCOSE SERPL-MCNC: 279 MG/DL (ref 65–140)
HCT VFR BLD AUTO: 33.5 % (ref 36.5–49.3)
HGB BLD-MCNC: 9.9 G/DL (ref 12–17)
MCH RBC QN AUTO: 22.4 PG (ref 26.8–34.3)
MCHC RBC AUTO-ENTMCNC: 29.6 G/DL (ref 31.4–37.4)
MCV RBC AUTO: 76 FL (ref 82–98)
PLATELET # BLD AUTO: 380 THOUSANDS/UL (ref 149–390)
PMV BLD AUTO: 10.5 FL (ref 8.9–12.7)
POTASSIUM SERPL-SCNC: 4.5 MMOL/L (ref 3.5–5.3)
PROT SERPL-MCNC: 7.3 G/DL (ref 6.4–8.4)
RBC # BLD AUTO: 4.42 MILLION/UL (ref 3.88–5.62)
SODIUM SERPL-SCNC: 137 MMOL/L (ref 135–147)
WBC # BLD AUTO: 10.98 THOUSAND/UL (ref 4.31–10.16)

## 2023-10-13 PROCEDURE — 99285 EMERGENCY DEPT VISIT HI MDM: CPT | Performed by: PHYSICIAN ASSISTANT

## 2023-10-13 PROCEDURE — 85027 COMPLETE CBC AUTOMATED: CPT | Performed by: PHYSICIAN ASSISTANT

## 2023-10-13 PROCEDURE — 80053 COMPREHEN METABOLIC PANEL: CPT | Performed by: PHYSICIAN ASSISTANT

## 2023-10-13 PROCEDURE — 99285 EMERGENCY DEPT VISIT HI MDM: CPT

## 2023-10-13 PROCEDURE — 36415 COLL VENOUS BLD VENIPUNCTURE: CPT | Performed by: PHYSICIAN ASSISTANT

## 2023-10-13 PROCEDURE — 74177 CT ABD & PELVIS W/CONTRAST: CPT

## 2023-10-13 PROCEDURE — 82948 REAGENT STRIP/BLOOD GLUCOSE: CPT

## 2023-10-13 PROCEDURE — 70450 CT HEAD/BRAIN W/O DYE: CPT

## 2023-10-13 PROCEDURE — 71260 CT THORAX DX C+: CPT

## 2023-10-13 PROCEDURE — 71046 X-RAY EXAM CHEST 2 VIEWS: CPT

## 2023-10-13 PROCEDURE — 99221 1ST HOSP IP/OBS SF/LOW 40: CPT | Performed by: INTERNAL MEDICINE

## 2023-10-13 PROCEDURE — 72125 CT NECK SPINE W/O DYE: CPT

## 2023-10-13 PROCEDURE — 96374 THER/PROPH/DIAG INJ IV PUSH: CPT

## 2023-10-13 RX ORDER — OXYCODONE HYDROCHLORIDE 5 MG/1
5 TABLET ORAL EVERY 4 HOURS PRN
Status: DISCONTINUED | OUTPATIENT
Start: 2023-10-13 | End: 2023-10-13

## 2023-10-13 RX ORDER — CLOPIDOGREL BISULFATE 75 MG/1
75 TABLET ORAL DAILY
Status: DISCONTINUED | OUTPATIENT
Start: 2023-10-14 | End: 2023-10-15 | Stop reason: HOSPADM

## 2023-10-13 RX ORDER — INSULIN LISPRO 100 [IU]/ML
1-6 INJECTION, SOLUTION INTRAVENOUS; SUBCUTANEOUS
Status: DISCONTINUED | OUTPATIENT
Start: 2023-10-14 | End: 2023-10-15 | Stop reason: HOSPADM

## 2023-10-13 RX ORDER — CEPHALEXIN 500 MG/1
500 CAPSULE ORAL 2 TIMES DAILY
Status: DISCONTINUED | OUTPATIENT
Start: 2023-10-13 | End: 2023-10-15 | Stop reason: HOSPADM

## 2023-10-13 RX ORDER — AMLODIPINE BESYLATE 5 MG/1
5 TABLET ORAL DAILY
Status: DISCONTINUED | OUTPATIENT
Start: 2023-10-14 | End: 2023-10-15 | Stop reason: HOSPADM

## 2023-10-13 RX ORDER — LIDOCAINE 50 MG/G
1 PATCH TOPICAL DAILY
Status: DISCONTINUED | OUTPATIENT
Start: 2023-10-14 | End: 2023-10-15 | Stop reason: HOSPADM

## 2023-10-13 RX ORDER — PANTOPRAZOLE SODIUM 40 MG/1
40 TABLET, DELAYED RELEASE ORAL DAILY
Status: DISCONTINUED | OUTPATIENT
Start: 2023-10-14 | End: 2023-10-15 | Stop reason: HOSPADM

## 2023-10-13 RX ORDER — INSULIN LISPRO 100 [IU]/ML
8 INJECTION, SOLUTION INTRAVENOUS; SUBCUTANEOUS
Status: DISCONTINUED | OUTPATIENT
Start: 2023-10-14 | End: 2023-10-15 | Stop reason: HOSPADM

## 2023-10-13 RX ORDER — MORPHINE SULFATE 4 MG/ML
4 INJECTION, SOLUTION INTRAMUSCULAR; INTRAVENOUS ONCE
Status: COMPLETED | OUTPATIENT
Start: 2023-10-13 | End: 2023-10-13

## 2023-10-13 RX ORDER — HYDROMORPHONE HCL/PF 1 MG/ML
0.5 SYRINGE (ML) INJECTION
Status: DISCONTINUED | OUTPATIENT
Start: 2023-10-13 | End: 2023-10-15 | Stop reason: HOSPADM

## 2023-10-13 RX ORDER — LIDOCAINE 50 MG/G
1 PATCH TOPICAL ONCE
Status: COMPLETED | OUTPATIENT
Start: 2023-10-13 | End: 2023-10-14

## 2023-10-13 RX ORDER — POLYETHYLENE GLYCOL 3350 17 G/17G
17 POWDER, FOR SOLUTION ORAL DAILY
Status: DISCONTINUED | OUTPATIENT
Start: 2023-10-14 | End: 2023-10-15 | Stop reason: HOSPADM

## 2023-10-13 RX ORDER — MUSCLE RUB CREAM 100; 150 MG/G; MG/G
CREAM TOPICAL 4 TIMES DAILY PRN
Status: DISCONTINUED | OUTPATIENT
Start: 2023-10-13 | End: 2023-10-15 | Stop reason: HOSPADM

## 2023-10-13 RX ORDER — FINASTERIDE 5 MG/1
5 TABLET, FILM COATED ORAL DAILY
Status: DISCONTINUED | OUTPATIENT
Start: 2023-10-14 | End: 2023-10-15 | Stop reason: HOSPADM

## 2023-10-13 RX ORDER — OXYCODONE HYDROCHLORIDE 5 MG/1
5 TABLET ORAL EVERY 4 HOURS PRN
Status: DISCONTINUED | OUTPATIENT
Start: 2023-10-13 | End: 2023-10-15 | Stop reason: HOSPADM

## 2023-10-13 RX ORDER — ONDANSETRON 2 MG/ML
4 INJECTION INTRAMUSCULAR; INTRAVENOUS EVERY 6 HOURS PRN
Status: DISCONTINUED | OUTPATIENT
Start: 2023-10-13 | End: 2023-10-15 | Stop reason: HOSPADM

## 2023-10-13 RX ORDER — ATORVASTATIN CALCIUM 20 MG/1
20 TABLET, FILM COATED ORAL DAILY
Status: DISCONTINUED | OUTPATIENT
Start: 2023-10-14 | End: 2023-10-15 | Stop reason: HOSPADM

## 2023-10-13 RX ORDER — ACETAMINOPHEN 325 MG/1
650 TABLET ORAL EVERY 6 HOURS SCHEDULED
Status: DISCONTINUED | OUTPATIENT
Start: 2023-10-13 | End: 2023-10-15 | Stop reason: HOSPADM

## 2023-10-13 RX ORDER — OXYBUTYNIN CHLORIDE 5 MG/1
5 TABLET, EXTENDED RELEASE ORAL DAILY
Status: DISCONTINUED | OUTPATIENT
Start: 2023-10-14 | End: 2023-10-15 | Stop reason: HOSPADM

## 2023-10-13 RX ORDER — LISINOPRIL 2.5 MG/1
2.5 TABLET ORAL EVERY 24 HOURS
Status: DISCONTINUED | OUTPATIENT
Start: 2023-10-13 | End: 2023-10-15 | Stop reason: HOSPADM

## 2023-10-13 RX ORDER — MELOXICAM 7.5 MG/1
7.5 TABLET ORAL DAILY
Status: DISCONTINUED | OUTPATIENT
Start: 2023-10-14 | End: 2023-10-13

## 2023-10-13 RX ORDER — TAMSULOSIN HYDROCHLORIDE 0.4 MG/1
0.4 CAPSULE ORAL
Status: DISCONTINUED | OUTPATIENT
Start: 2023-10-13 | End: 2023-10-15 | Stop reason: HOSPADM

## 2023-10-13 RX ORDER — ENOXAPARIN SODIUM 100 MG/ML
40 INJECTION SUBCUTANEOUS DAILY
Status: DISCONTINUED | OUTPATIENT
Start: 2023-10-14 | End: 2023-10-15 | Stop reason: HOSPADM

## 2023-10-13 RX ORDER — METHOCARBAMOL 500 MG/1
750 TABLET, FILM COATED ORAL ONCE
Status: COMPLETED | OUTPATIENT
Start: 2023-10-13 | End: 2023-10-13

## 2023-10-13 RX ORDER — INSULIN GLARGINE 100 [IU]/ML
38 INJECTION, SOLUTION SUBCUTANEOUS
Status: DISCONTINUED | OUTPATIENT
Start: 2023-10-13 | End: 2023-10-13

## 2023-10-13 RX ORDER — ACETAMINOPHEN 325 MG/1
650 TABLET ORAL ONCE
Status: COMPLETED | OUTPATIENT
Start: 2023-10-13 | End: 2023-10-13

## 2023-10-13 RX ORDER — GABAPENTIN 300 MG/1
300 CAPSULE ORAL 3 TIMES DAILY
Status: DISCONTINUED | OUTPATIENT
Start: 2023-10-13 | End: 2023-10-15 | Stop reason: HOSPADM

## 2023-10-13 RX ADMIN — IOHEXOL 100 ML: 350 INJECTION, SOLUTION INTRAVENOUS at 16:48

## 2023-10-13 RX ADMIN — LIDOCAINE 1 PATCH: 50 PATCH CUTANEOUS at 16:27

## 2023-10-13 RX ADMIN — ACETAMINOPHEN 650 MG: 325 TABLET, FILM COATED ORAL at 16:27

## 2023-10-13 RX ADMIN — CEPHALEXIN 500 MG: 500 CAPSULE ORAL at 20:34

## 2023-10-13 RX ADMIN — MORPHINE SULFATE 4 MG: 4 INJECTION INTRAVENOUS at 18:27

## 2023-10-13 RX ADMIN — ACETAMINOPHEN 650 MG: 325 TABLET, FILM COATED ORAL at 20:31

## 2023-10-13 RX ADMIN — TAMSULOSIN HYDROCHLORIDE 0.4 MG: 0.4 CAPSULE ORAL at 20:31

## 2023-10-13 RX ADMIN — INSULIN DETEMIR 38 UNITS: 100 INJECTION, SOLUTION SUBCUTANEOUS at 22:08

## 2023-10-13 RX ADMIN — DICLOFENAC SODIUM 2 G: 10 GEL TOPICAL at 22:10

## 2023-10-13 RX ADMIN — HYDROMORPHONE HYDROCHLORIDE 0.5 MG: 1 INJECTION, SOLUTION INTRAMUSCULAR; INTRAVENOUS; SUBCUTANEOUS at 22:27

## 2023-10-13 RX ADMIN — METHOCARBAMOL 750 MG: 500 TABLET ORAL at 16:27

## 2023-10-13 RX ADMIN — LISINOPRIL 2.5 MG: 2.5 TABLET ORAL at 20:32

## 2023-10-13 RX ADMIN — GABAPENTIN 300 MG: 300 CAPSULE ORAL at 20:32

## 2023-10-13 NOTE — ED PROVIDER NOTES
History  Chief Complaint   Patient presents with    Fall     Pt reports falling in driveway last night, increasing L sided abdominal pain, denies blood thinners, denies head strike, denies LOC     68 yo on plavix here for fall. Occurred last night. Holley Altagracia onto left sided directly onto the flank. Having pain from left lateral chest to left lower quadrant. States he's not sure what caused him to fall but think his prosthetic leg led to his fall. Since last night his pain is increasing. Having pain with deep inspiration, bending, twisting of the torso. No fever or chills. No dysuria. No extremity numbness, tingling, weakness. Also having headache and neck pain. History provided by:  Patient   used: No    Fall  Associated symptoms: abdominal pain, chest pain, headaches and neck pain    Associated symptoms: no back pain, no seizures and no vomiting        Prior to Admission Medications   Prescriptions Last Dose Informant Patient Reported? Taking?    Glucagon (Gvoke HypoPen 2-Pack) 1 MG/0.2ML SOAJ  Self Yes No   Sig: Inject 1 mg under the skin   HumaLOG KwikPen 100 units/mL injection pen  Self Yes No   albuterol (PROVENTIL HFA,VENTOLIN HFA) 90 mcg/act inhaler  Self No No   Sig: Inhale 2 puffs every 4 (four) hours as needed for wheezing or shortness of breath (coughing spells)   alfuzosin (UROXATRAL) 10 mg 24 hr tablet  Self No No   Sig: Take 1 tablet (10 mg total) by mouth daily   amLODIPine (NORVASC) 5 mg tablet  Self Yes No   Sig: Take 5 mg by mouth daily   atorvastatin (LIPITOR) 20 mg tablet  Self Yes No   Sig: Take 20 mg by mouth daily   benzonatate (TESSALON PERLES) 100 mg capsule  Self Yes No   Sig: Take 100 mg by mouth 3 (three) times a day as needed for cough   cephalexin (KEFLEX) 500 mg capsule  Self Yes No   Sig: Take 500 mg by mouth 2 (two) times a day   clopidogrel (PLAVIX) 75 mg tablet  Self Yes No   Sig: Take 75 mg by mouth daily   finasteride (PROSCAR) 5 mg tablet  Self No No   Sig: Take 1 tablet (5 mg total) by mouth daily   gabapentin (NEURONTIN) 300 mg capsule  Self Yes No   Sig: Take 300 mg by mouth 3 (three) times a day   insulin NPH (HumuLIN N,NovoLIN N) 100 Units/mL subcutaneous injection  Self No No   Sig: Inject 35 Units under the skin 2 (two) times a day before meals   Patient taking differently: Inject 8 Units under the skin 2 (two) times a day before meals   insulin degludec Sharon Pacini FlexTouch) 200 units/mL CONCENTRATED U-200 injection pen  Self No No   Sig: Inject 25 Units under the skin daily at bedtime   Patient taking differently: Inject 36 Units under the skin daily at bedtime   lisinopril (ZESTRIL) 2.5 mg tablet  Self Yes No   Sig: every 24 hours   meloxicam (MOBIC) 7.5 mg tablet  Self Yes No   Sig: Take 7.5 mg by mouth daily   metFORMIN (GLUCOPHAGE) 500 mg tablet  Self Yes No   Sig: Take 1,000 mg by mouth 2 (two) times a day with meals   oxyCODONE (ROXICODONE) 5 immediate release tablet  Self Yes No   Sig: TAKE 1 TABLET BY MOUTH EVERY 4 HOURS AS NEEDED FOR MODERATE PAIN (PAIN SCORE 4-6) MAX DAILY AMOUNT 30MG   Patient not taking: Reported on 9/8/2023   pantoprazole (PROTONIX) 40 mg tablet  Self No No   Sig: Take 1 tablet by mouth daily for 30 days   tolterodine (DETROL LA) 2 mg 24 hr capsule  Self No No   Sig: Take 1 capsule (2 mg total) by mouth daily      Facility-Administered Medications: None       Past Medical History:   Diagnosis Date    BPH (benign prostatic hypertrophy) 10 /2020    Chronic kidney disease 8/2020    Diabetes mellitus ([facility])     Foot ulcer due to secondary DM ([facility])     Hypertension 4 /2021    Urinary incontinence 8/2022    Urinary tract infection 4/2023       Past Surgical History:   Procedure Laterality Date    ESOPHAGOGASTRODUODENOSCOPY N/A 02/10/2017    Procedure: ESOPHAGOGASTRODUODENOSCOPY (EGD); Surgeon: Chey Gonzalez MD;  Location: [facility];   Service:     JOINT REPLACEMENT      LEG AMPUTATION THROUGH LOWER TIBIA AND FIBULA Left 04/06/2023 Procedure: AMPUTATION BELOW KNEE (BKA); Surgeon: Katiana Cedeño DO;  Location: [facility];  Service: Vascular    ORCHIECTOMY  2022    they thought It was cancer but turned out not to be       Family History   Problem Relation Age of Onset    Heart attack Father     Heart disease Father          of massive heart attack    Cancer Mother          stomach cancer    Diabetes Mother     No Known Problems Brother     No Known Problems Daughter      I have reviewed and agree with the history as documented. E-Cigarette/Vaping    E-Cigarette Use Never User      E-Cigarette/Vaping Substances    Nicotine No     THC No     CBD No     Flavoring No     Other No     Unknown No      Social History     Tobacco Use    Smoking status: Former     Packs/day: 1.50     Years: 40.00     Total pack years: 60.00     Types: Cigarettes, Pipe     Start date: 1969     Quit date: 2009     Years since quittin.7     Passive exposure: Past    Smokeless tobacco: Never    Tobacco comments:     already have   Vaping Use    Vaping Use: Never used   Substance Use Topics    Alcohol use: Not Currently    Drug use: Never       Review of Systems   Constitutional:  Negative for chills and fever. HENT:  Negative for ear pain and sore throat. Eyes:  Negative for pain and visual disturbance. Respiratory:  Negative for cough and shortness of breath. Cardiovascular:  Positive for chest pain. Negative for palpitations. Gastrointestinal:  Positive for abdominal pain. Negative for vomiting. Genitourinary:  Negative for dysuria and hematuria. Musculoskeletal:  Positive for neck pain. Negative for arthralgias and back pain. Skin:  Negative for color change and rash. Neurological:  Positive for headaches. Negative for seizures and syncope. All other systems reviewed and are negative. Physical Exam  Physical Exam  Vitals and nursing note reviewed. Constitutional:       General: He is not in acute distress. Appearance: He is well-developed. HENT:      Head: Normocephalic and atraumatic. Eyes:      Conjunctiva/sclera: Conjunctivae normal.   Cardiovascular:      Rate and Rhythm: Normal rate and regular rhythm. Heart sounds: No murmur heard. Pulmonary:      Effort: Pulmonary effort is normal. No respiratory distress. Breath sounds: Normal breath sounds. Chest:      Chest wall: Tenderness present. Abdominal:      Palpations: Abdomen is soft. Tenderness: There is abdominal tenderness in the left upper quadrant and left lower quadrant. Musculoskeletal:         General: No swelling. Cervical back: Neck supple. Tenderness and bony tenderness present. No swelling, edema, erythema, spasms or crepitus. Pain with movement present. Normal range of motion. Thoracic back: Normal.      Lumbar back: Normal.   Skin:     General: Skin is warm and dry. Capillary Refill: Capillary refill takes less than 2 seconds. Neurological:      Mental Status: He is alert.    Psychiatric:         Mood and Affect: Mood normal.         Vital Signs  ED Triage Vitals   Temperature Pulse Respirations Blood Pressure SpO2   10/13/23 1425 10/13/23 1425 10/13/23 1900 10/13/23 1425 10/13/23 1425   98 °F (36.7 °C) (!) 118 18 135/60 99 %      Temp Source Heart Rate Source Patient Position - Orthostatic VS BP Location FiO2 (%)   10/14/23 0342 10/13/23 1425 10/13/23 1425 10/13/23 1425 --   Oral Monitor Sitting Left arm       Pain Score       10/13/23 1627       Med Not Given for Pain - for MAR use only           Vitals:    10/14/23 1501 10/14/23 2019 10/14/23 2239 10/15/23 0713   BP: 137/59 134/60 132/60 136/62   Pulse: 65 65 60 65   Patient Position - Orthostatic VS:    Lying         Visual Acuity      ED Medications  Medications   lidocaine (LIDODERM) 5 % patch 1 patch (1 patch Topical Patch Removed 10/14/23 0401)   acetaminophen (TYLENOL) tablet 650 mg (650 mg Oral Given 10/13/23 1627)   methocarbamol (ROBAXIN) tablet 750 mg (750 mg Oral Given 10/13/23 1627)   iohexol (OMNIPAQUE) 350 MG/ML injection (MULTI-DOSE) 100 mL (100 mL Intravenous Given 10/13/23 1648)   morphine injection 4 mg (4 mg Intravenous Given 10/13/23 1827)       Diagnostic Studies  Results Reviewed       Procedure Component Value Units Date/Time    Basic metabolic panel [[id number]]  (Abnormal) Collected: 10/14/23 0449    Lab Status: Final result Specimen: Blood from Arm, Right Updated: 10/14/23 0609     Sodium 136 mmol/L      Potassium 4.4 mmol/L      Chloride 106 mmol/L      CO2 25 mmol/L      ANION GAP 5 mmol/L      BUN 20 mg/dL      Creatinine 1.11 mg/dL      Glucose 238 mg/dL      Glucose, Fasting 238 mg/dL      Calcium 8.4 mg/dL      eGFR 67 ml/min/1.73sq m     Narrative:      [place] guidelines for Chronic Kidney Disease (CKD):     Stage 1 with normal or high GFR (GFR > 90 mL/min/1.73 square meters)    Stage 2 Mild CKD (GFR = 60-89 mL/min/1.73 square meters)    Stage 3A Moderate CKD (GFR = 45-59 mL/min/1.73 square meters)    Stage 3B Moderate CKD (GFR = 30-44 mL/min/1.73 square meters)    Stage 4 Severe CKD (GFR = 15-29 mL/min/1.73 square meters)    Stage 5 End Stage CKD (GFR <15 mL/min/1.73 square meters)  Note: GFR calculation is accurate only with a steady state creatinine    Magnesium [[id number]]  (Normal) Collected: 10/14/23 0449    Lab Status: Final result Specimen: Blood from Arm, Right Updated: 10/14/23 0609     Magnesium 2.0 mg/dL     CBC (With Platelets) [[id number]]  (Abnormal) Collected: 10/14/23 0449    Lab Status: Final result Specimen: Blood from Arm, Right Updated: 10/14/23 0530     WBC 7.68 Thousand/uL      RBC 4.08 Million/uL      Hemoglobin 9.0 g/dL      Hematocrit 31.2 %      MCV 77 fL      MCH 22.1 pg      MCHC 28.8 g/dL      RDW 18.2 %      Platelets 256 Thousands/uL      MPV 10.7 fL     Comprehensive metabolic panel [[id number]]  (Abnormal) Collected: 10/13/23 7309    Lab Status: Final result Specimen: Blood from Arm, Left Updated: 10/13/23 1631     Sodium 137 mmol/L      Potassium 4.5 mmol/L      Chloride 107 mmol/L      CO2 24 mmol/L      ANION GAP 6 mmol/L      BUN 22 mg/dL      Creatinine 1.19 mg/dL      Glucose 279 mg/dL      Calcium 8.8 mg/dL      AST 11 U/L      ALT 11 U/L      Alkaline Phosphatase 69 U/L      Total Protein 7.3 g/dL      Albumin 4.1 g/dL      Total Bilirubin 0.40 mg/dL      eGFR 61 ml/min/1.73sq m     Narrative:      [place] guidelines for Chronic Kidney Disease (CKD):     Stage 1 with normal or high GFR (GFR > 90 mL/min/1.73 square meters)    Stage 2 Mild CKD (GFR = 60-89 mL/min/1.73 square meters)    Stage 3A Moderate CKD (GFR = 45-59 mL/min/1.73 square meters)    Stage 3B Moderate CKD (GFR = 30-44 mL/min/1.73 square meters)    Stage 4 Severe CKD (GFR = 15-29 mL/min/1.73 square meters)    Stage 5 End Stage CKD (GFR <15 mL/min/1.73 square meters)  Note: GFR calculation is accurate only with a steady state creatinine    CBC [[id number]]  (Abnormal) Collected: 10/13/23 1559    Lab Status: Final result Specimen: Blood from Arm, Left Updated: 10/13/23 1607     WBC 10.98 Thousand/uL      RBC 4.42 Million/uL      Hemoglobin 9.9 g/dL      Hematocrit 33.5 %      MCV 76 fL      MCH 22.4 pg      MCHC 29.6 g/dL      RDW 17.8 %      Platelets 677 Thousands/uL      MPV 10.5 fL                    CT chest abdomen pelvis w contrast   Final Result by Adrian Sotelo MD (10/13 8502)      Question left anterior and lateral 4th-7th nondisplaced rib fractures. Prostatomegaly. I personally communicated this study with Silvio Donald on 10/13/2023 5:29 PM.                     Workstation performed: [id number]         CT head without contrast   Final Result by Adrian Sotelo MD (10/13 1700)      No acute intracranial abnormality.                   Workstation performed: [id number]         CT spine cervical without contrast   Final Result by Adrian Sotelo MD (10/13 8020)      No cervical spine fracture or traumatic malalignment. Workstation performed: [id number]         XR chest 2 views   Final Result by John Amador MD (34/02 3503)      No acute cardiopulmonary disease. Workstation performed: [id number]                    Procedures  Procedures         ED Course                               SBIRT 22yo+      Flowsheet Row Most Recent Value   Initial Alcohol Screen: US AUDIT-C     1. How often do you have a drink containing alcohol? 0 Filed at: 10/13/2023 1600   2. How many drinks containing alcohol do you have on a typical day you are drinking? 0 Filed at: 10/13/2023 1600   3a. Male UNDER 65: How often do you have five or more drinks on one occasion? 0 Filed at: 10/13/2023 1600   Audit-C Score 0 Filed at: 10/13/2023 1600   DERRICK: How many times in the past year have you. .. Used an illegal drug or used a prescription medication for non-medical reasons? Never Filed at: 10/13/2023 1600                      Medical Decision Making  DDx including but not limited to: intracranial injury, concussion, cervical injury, intrathoracic injury, intraabdominal injury, extremity injury--fracture, dislocation, strain, sprain, contusion. Plan: CT c/a/p with head and neck given worsening pain and tachycardia. Unable to clear C spine with nexus criteria. MDM: 70 yo s/p fall. Multiple rib fractures. Pain difficult to control. Recommended admission. Pt in agreement. Stable at time of admission. Amount and/or Complexity of Data Reviewed  Labs: ordered. Radiology: ordered. Risk  OTC drugs. Prescription drug management. Decision regarding hospitalization.              Disposition  Final diagnoses:   Multiple rib fractures     Time reflects when diagnosis was documented in both MDM as applicable and the Disposition within this note       Time User Action Codes Description Comment    10/13/2023  7:16 PM Sofya Martinez Add [S22.49XA] Multiple rib fractures     10/15/2023  9:24 AM Robin Joya Add [U73.236,  L97.509] Type 2 diabetes mellitus with foot ulcer [facility])           ED Disposition       ED Disposition   Admit    Condition   Stable    Date/Time   Fri Oct 13, 2023 1916    Comment   Case was discussed with Dr. Dulce Smith and the patient's admission status was agreed to be Admission Status: observation status to the service of Dr. Dulce Smith .                Follow-up Information    None         Discharge Medication List as of 10/15/2023  1:08 PM        START taking these medications    Details   Diclofenac Sodium (VOLTAREN) 1 % Apply 2 g topically 4 (four) times a day, Starting Sun 10/15/2023, Normal      lidocaine (LIDODERM) 5 % Apply 1 patch topically over 12 hours daily for 10 days Remove & Discard patch within 12 hours or as directed by MD, Starting Sun 10/15/2023, Until Wed 10/25/2023, Normal           CONTINUE these medications which have CHANGED    Details   insulin degludec Robert Bruce FlexTouch) 200 units/mL CONCENTRATED U-200 injection pen Inject 36 Units under the skin daily at bedtime, Starting Sun 10/15/2023, No Print      insulin NPH (HumuLIN N,NovoLIN N) 100 Units/mL subcutaneous injection Inject 8 Units under the skin 2 (two) times a day before meals, Starting Sun 10/15/2023, No Print      oxyCODONE (ROXICODONE) 5 immediate release tablet Take 1 tablet (5 mg total) by mouth every 4 (four) hours as needed for moderate pain for up to 10 doses Max Daily Amount: 30 mg, Starting Sun 10/15/2023, Normal           CONTINUE these medications which have NOT CHANGED    Details   albuterol (PROVENTIL HFA,VENTOLIN HFA) 90 mcg/act inhaler Inhale 2 puffs every 4 (four) hours as needed for wheezing or shortness of breath (coughing spells), Starting Sun 2/5/2017, Print      alfuzosin (UROXATRAL) 10 mg 24 hr tablet Take 1 tablet (10 mg total) by mouth daily, Starting Thu 6/8/2023, Normal      amLODIPine (NORVASC) 5 mg tablet Take 5 mg by mouth daily, Historical Med atorvastatin (LIPITOR) 20 mg tablet Take 20 mg by mouth daily, Historical Med      benzonatate (TESSALON PERLES) 100 mg capsule Take 100 mg by mouth 3 (three) times a day as needed for cough, Historical Med      cephalexin (KEFLEX) 500 mg capsule Take 500 mg by mouth 2 (two) times a day, Starting Thu 10/5/2023, Historical Med      clopidogrel (PLAVIX) 75 mg tablet Take 75 mg by mouth daily, Historical Med      finasteride (PROSCAR) 5 mg tablet Take 1 tablet (5 mg total) by mouth daily, Starting Tue 4/11/2023, No Print      gabapentin (NEURONTIN) 300 mg capsule Take 300 mg by mouth 3 (three) times a day, Historical Med      Glucagon (Gvoke HypoPen 2-Pack) 1 MG/0.2ML SOAJ Inject 1 mg under the skin, Starting Wed 7/5/2023, Historical Med      HumaLOG KwikPen 100 units/mL injection pen Historical Med      lisinopril (ZESTRIL) 2.5 mg tablet every 24 hours, Historical Med      metFORMIN (GLUCOPHAGE) 500 mg tablet Take 1,000 mg by mouth 2 (two) times a day with meals, Historical Med      pantoprazole (PROTONIX) 40 mg tablet Take 1 tablet by mouth daily for 30 days, Starting Wed 2/15/2017, Until Fri 9/8/2023, Print      tolterodine (DETROL LA) 2 mg 24 hr capsule Take 1 capsule (2 mg total) by mouth daily, Starting Fri 9/8/2023, Normal           STOP taking these medications       meloxicam (MOBIC) 7.5 mg tablet Comments:   Reason for Stopping:               No discharge procedures on file.     PDMP Review       None            ED Provider  Electronically Signed by             Silvio Donald PA-C  10/16/23 8617

## 2023-10-13 NOTE — ASSESSMENT & PLAN NOTE
Presented with 1 episode of fall while getting out of car. Denied any symptoms prior to fall. Denies lightheadedness, palpitations, dizziness, vertigo, visual disturbances. Has history of left BKA states he was taking out his legs somehow he slipped and fell down.       Trauma work-up reveals left anterior/lateral 4th-7th nondispl rib fractures  Prn pain control as below

## 2023-10-13 NOTE — ASSESSMENT & PLAN NOTE
Noted on imaging. Continue as needed Tylenol/oxycodone/Voltaren gel/Bengay cream/lidocaine patch  Use IV Dilaudid sparingly  Continue with incentive spirometry  Currently not hypoxic.

## 2023-10-13 NOTE — ASSESSMENT & PLAN NOTE
Lab Results   Component Value Date    HGBA1C 6.7 (H) 07/17/2023   Allergic to Lantus. At home On Tresiba 38 units at bedtime along with Humalog 8 units 3 times daily with meals.    -We will continue Levemir at 38 units at bedtime along with lispro 8 units 3 times daily with meals  -Hypoglycemic protocol  -Sliding scale coverage    **Please note patient has a right diabetic foot infection for which he is being treated with oral Keflex as an outpatient for 2 weeks by his podiatrist.  Continue with Keflex during hospitalization.

## 2023-10-14 LAB
ANION GAP SERPL CALCULATED.3IONS-SCNC: 5 MMOL/L
BUN SERPL-MCNC: 20 MG/DL (ref 5–25)
CALCIUM SERPL-MCNC: 8.4 MG/DL (ref 8.4–10.2)
CHLORIDE SERPL-SCNC: 106 MMOL/L (ref 96–108)
CO2 SERPL-SCNC: 25 MMOL/L (ref 21–32)
CREAT SERPL-MCNC: 1.11 MG/DL (ref 0.6–1.3)
ERYTHROCYTE [DISTWIDTH] IN BLOOD BY AUTOMATED COUNT: 18.2 % (ref 11.6–15.1)
GFR SERPL CREATININE-BSD FRML MDRD: 67 ML/MIN/1.73SQ M
GLUCOSE P FAST SERPL-MCNC: 238 MG/DL (ref 65–99)
GLUCOSE SERPL-MCNC: 133 MG/DL (ref 65–140)
GLUCOSE SERPL-MCNC: 144 MG/DL (ref 65–140)
GLUCOSE SERPL-MCNC: 201 MG/DL (ref 65–140)
GLUCOSE SERPL-MCNC: 238 MG/DL (ref 65–140)
GLUCOSE SERPL-MCNC: 299 MG/DL (ref 65–140)
HCT VFR BLD AUTO: 31.2 % (ref 36.5–49.3)
HGB BLD-MCNC: 9 G/DL (ref 12–17)
MAGNESIUM SERPL-MCNC: 2 MG/DL (ref 1.9–2.7)
MCH RBC QN AUTO: 22.1 PG (ref 26.8–34.3)
MCHC RBC AUTO-ENTMCNC: 28.8 G/DL (ref 31.4–37.4)
MCV RBC AUTO: 77 FL (ref 82–98)
PLATELET # BLD AUTO: 343 THOUSANDS/UL (ref 149–390)
PMV BLD AUTO: 10.7 FL (ref 8.9–12.7)
POTASSIUM SERPL-SCNC: 4.4 MMOL/L (ref 3.5–5.3)
RBC # BLD AUTO: 4.08 MILLION/UL (ref 3.88–5.62)
SODIUM SERPL-SCNC: 136 MMOL/L (ref 135–147)
WBC # BLD AUTO: 7.68 THOUSAND/UL (ref 4.31–10.16)

## 2023-10-14 PROCEDURE — 99232 SBSQ HOSP IP/OBS MODERATE 35: CPT | Performed by: FAMILY MEDICINE

## 2023-10-14 PROCEDURE — 85027 COMPLETE CBC AUTOMATED: CPT | Performed by: INTERNAL MEDICINE

## 2023-10-14 PROCEDURE — 83735 ASSAY OF MAGNESIUM: CPT | Performed by: INTERNAL MEDICINE

## 2023-10-14 PROCEDURE — 82948 REAGENT STRIP/BLOOD GLUCOSE: CPT

## 2023-10-14 PROCEDURE — 80048 BASIC METABOLIC PNL TOTAL CA: CPT | Performed by: INTERNAL MEDICINE

## 2023-10-14 RX ADMIN — AMLODIPINE BESYLATE 5 MG: 5 TABLET ORAL at 08:54

## 2023-10-14 RX ADMIN — ACETAMINOPHEN 650 MG: 325 TABLET, FILM COATED ORAL at 12:22

## 2023-10-14 RX ADMIN — HYDROMORPHONE HYDROCHLORIDE 0.5 MG: 1 INJECTION, SOLUTION INTRAMUSCULAR; INTRAVENOUS; SUBCUTANEOUS at 03:52

## 2023-10-14 RX ADMIN — INSULIN LISPRO 4 UNITS: 100 INJECTION, SOLUTION INTRAVENOUS; SUBCUTANEOUS at 12:18

## 2023-10-14 RX ADMIN — ACETAMINOPHEN 650 MG: 325 TABLET, FILM COATED ORAL at 17:30

## 2023-10-14 RX ADMIN — DICLOFENAC SODIUM 2 G: 10 GEL TOPICAL at 12:18

## 2023-10-14 RX ADMIN — INSULIN DETEMIR 38 UNITS: 100 INJECTION, SOLUTION SUBCUTANEOUS at 21:56

## 2023-10-14 RX ADMIN — TAMSULOSIN HYDROCHLORIDE 0.4 MG: 0.4 CAPSULE ORAL at 17:30

## 2023-10-14 RX ADMIN — GABAPENTIN 300 MG: 300 CAPSULE ORAL at 17:30

## 2023-10-14 RX ADMIN — DICLOFENAC SODIUM 2 G: 10 GEL TOPICAL at 08:55

## 2023-10-14 RX ADMIN — ATORVASTATIN CALCIUM 20 MG: 20 TABLET, FILM COATED ORAL at 08:54

## 2023-10-14 RX ADMIN — PANTOPRAZOLE SODIUM 40 MG: 40 TABLET, DELAYED RELEASE ORAL at 08:54

## 2023-10-14 RX ADMIN — CLOPIDOGREL BISULFATE 75 MG: 75 TABLET ORAL at 08:54

## 2023-10-14 RX ADMIN — ACETAMINOPHEN 650 MG: 325 TABLET, FILM COATED ORAL at 01:52

## 2023-10-14 RX ADMIN — OXYBUTYNIN CHLORIDE 5 MG: 5 TABLET, EXTENDED RELEASE ORAL at 08:54

## 2023-10-14 RX ADMIN — GABAPENTIN 300 MG: 300 CAPSULE ORAL at 08:54

## 2023-10-14 RX ADMIN — POLYETHYLENE GLYCOL 3350 17 G: 17 POWDER, FOR SOLUTION ORAL at 08:53

## 2023-10-14 RX ADMIN — CEPHALEXIN 500 MG: 500 CAPSULE ORAL at 17:30

## 2023-10-14 RX ADMIN — LISINOPRIL 2.5 MG: 2.5 TABLET ORAL at 20:24

## 2023-10-14 RX ADMIN — INSULIN LISPRO 8 UNITS: 100 INJECTION, SOLUTION INTRAVENOUS; SUBCUTANEOUS at 17:30

## 2023-10-14 RX ADMIN — INSULIN LISPRO 8 UNITS: 100 INJECTION, SOLUTION INTRAVENOUS; SUBCUTANEOUS at 08:56

## 2023-10-14 RX ADMIN — INSULIN LISPRO 8 UNITS: 100 INJECTION, SOLUTION INTRAVENOUS; SUBCUTANEOUS at 12:19

## 2023-10-14 RX ADMIN — ACETAMINOPHEN 650 MG: 325 TABLET, FILM COATED ORAL at 06:05

## 2023-10-14 RX ADMIN — HYDROMORPHONE HYDROCHLORIDE 0.5 MG: 1 INJECTION, SOLUTION INTRAMUSCULAR; INTRAVENOUS; SUBCUTANEOUS at 15:20

## 2023-10-14 RX ADMIN — CEPHALEXIN 500 MG: 500 CAPSULE ORAL at 08:54

## 2023-10-14 RX ADMIN — FINASTERIDE 5 MG: 5 TABLET, FILM COATED ORAL at 08:54

## 2023-10-14 RX ADMIN — GABAPENTIN 300 MG: 300 CAPSULE ORAL at 21:37

## 2023-10-14 RX ADMIN — INSULIN LISPRO 2 UNITS: 100 INJECTION, SOLUTION INTRAVENOUS; SUBCUTANEOUS at 08:55

## 2023-10-14 RX ADMIN — LIDOCAINE 1 PATCH: 50 PATCH CUTANEOUS at 08:52

## 2023-10-14 NOTE — PROGRESS NOTES
[address]  Progress Note  Name: Farooq Hendrix  MRN: [id number]  Unit/Bed#: [facility] I Date of Admission: 10/13/2023   Date of Service: 10/14/2023 I Hospital Day: 0    Assessment/Plan   * Fall  Assessment & Plan  Presented with 1 episode of fall while getting out of car. Denied any symptoms prior to fall. Denies lightheadedness, palpitations, dizziness, vertigo, visual disturbances. Has history of left BKA states he was taking out his legs somehow he slipped and fell down. Trauma work-up reveals left anterior/lateral 4th-7th nondispl rib fractures  Prn pain control as below    Closed fracture of multiple ribs of left side  Assessment & Plan  Noted on imaging. Continue as needed Tylenol/oxycodone/Voltaren gel/Bengay cream/lidocaine patch  Use IV Dilaudid sparingly  Continue with incentive spirometry  Currently not hypoxic. Type 2 diabetes mellitus with foot ulcer ([address])  Assessment & Plan  Lab Results   Component Value Date    HGBA1C 6.7 (H) 07/17/2023   Allergic to Lantus. At home On Tresiba 38 units at bedtime along with Humalog 8 units 3 times daily with meals.    -We will continue Levemir at 38 units at bedtime along with lispro 8 units 3 times daily with meals  -Hypoglycemic protocol  -Sliding scale coverage    **Please note patient has a right diabetic foot infection for which he is being treated with oral Keflex as an outpatient for 2 weeks by his podiatrist.  Continue with Keflex during hospitalization. Hx of BKA, left ([address])  Assessment & Plan  Noted         VTE Pharmacologic Prophylaxis:   Pharmacologic: Enoxaparin (Lovenox)  Mechanical VTE Prophylaxis in Place: No    Patient Centered Rounds: I have performed bedside rounds with nursing staff today. Education and Discussions with Family / Patient: patient    Time Spent for Care: 30 minutes. More than 50% of total time spent on counseling and coordination of care as described above.     Current Length of Stay: 0 day(s)    Current Patient Status: Observation   Certification Statement: The patient will continue to require additional inpatient hospital stay due to needs pain control    Discharge Plan: ?likely tomorrwo    Code Status: Level 3 - DNAR and DNI      Subjective:   Still with soreness associated with rib fractures. Used multiple doses of IV Dilaudid overnight. No shortness of breath. Patient ambulating without difficulty. Objective:     Vitals:   Temp (24hrs), Av.5 °F (36.4 °C), Min:97.3 °F (36.3 °C), Max:98 °F (36.7 °C)    Temp:  [97.3 °F (36.3 °C)-98 °F (36.7 °C)] 97.3 °F (36.3 °C)  HR:  [] 63  Resp:  [18-20] 18  BP: (118-149)/(58-71) 118/58  SpO2:  [96 %-99 %] 96 %  Body mass index is 27.5 kg/m². Input and Output Summary (last 24 hours): Intake/Output Summary (Last 24 hours) at 10/14/2023 1334  Last data filed at 10/14/2023 1223  Gross per 24 hour   Intake --   Output 1230 ml   Net -1230 ml       Physical Exam:     Physical Exam  Constitutional:       General: He is not in acute distress. Appearance: He is normal weight. He is not toxic-appearing. HENT:      Mouth/Throat:      Mouth: Mucous membranes are moist.      Pharynx: Oropharynx is clear. Cardiovascular:      Rate and Rhythm: Normal rate and regular rhythm. Pulses: Normal pulses. Pulmonary:      Effort: Pulmonary effort is normal. No respiratory distress. Breath sounds: Normal breath sounds. Abdominal:      General: Abdomen is flat. Bowel sounds are normal.      Palpations: Abdomen is soft. Musculoskeletal:      Comments: S/p left BKA   Neurological:      Mental Status: He is alert and oriented to person, place, and time.          Additional Data:     Labs:    Results from last 7 days   Lab Units 10/14/23  0449   WBC Thousand/uL 7.68   HEMOGLOBIN g/dL 9.0*   HEMATOCRIT % 31.2*   PLATELETS Thousands/uL 343     Results from last 7 days   Lab Units 10/14/23  0449 10/13/23  1559   SODIUM mmol/L 136 137 POTASSIUM mmol/L 4.4 4.5   CHLORIDE mmol/L 106 107   CO2 mmol/L 25 24   BUN mg/dL 20 22   CREATININE mg/dL 1.11 1.19   ANION GAP mmol/L 5 6   CALCIUM mg/dL 8.4 8.8   ALBUMIN g/dL  --  4.1   TOTAL BILIRUBIN mg/dL  --  0.40   ALK PHOS U/L  --  69   ALT U/L  --  11   AST U/L  --  11*   GLUCOSE RANDOM mg/dL 238* 279*         Results from last 7 days   Lab Units 10/14/23  1108 10/14/23  0708 10/13/23  2028   POC GLUCOSE mg/dl 299* 201* 214*                 * I Have Reviewed All Lab Data Listed Above. * Additional Pertinent Lab Tests Reviewed:  All Labs Within Last 24 Hours Reviewed        Recent Cultures (last 7 days):     Results from last 7 days   Lab Units 10/11/23  1302   URINE CULTURE  No Growth <1000 cfu/mL       Last 24 Hours Medication List:   Current Facility-Administered Medications   Medication Dose Route Frequency Provider Last Rate    acetaminophen  650 mg Oral Q6H [facility] River Zuñiga MD      amLODIPine  5 mg Oral Daily Eb Dawson MD      atorvastatin  20 mg Oral Daily Eb Dawson MD      cephalexin  500 mg Oral BID Eb Dawson MD      clopidogrel  75 mg Oral Daily Eb Dawson MD      Diclofenac Sodium  2 g Topical 4x Daily Eb Dawson MD      enoxaparin  40 mg Subcutaneous Daily Eb Dawson MD      finasteride  5 mg Oral Daily Eb Dawson MD      gabapentin  300 mg Oral TID Eb Dawson MD      HYDROmorphone  0.5 mg Intravenous Q3H PRN Eb Dawson MD      insulin detemir  38 Units Subcutaneous HS River Zuñiga MD      insulin lispro  1-6 Units Subcutaneous TID AC Eb Dawson MD      insulin lispro  8 Units Subcutaneous TID With Meals Eb Dawson MD      lidocaine  1 patch Topical Daily Eb Dawson MD      lisinopril  2.5 mg Oral Q24H Eb Dawson MD      menthol-methyl salicylate   Apply externally 4x Daily PRN Eb Dawson MD      ondansetron  4 mg Intravenous Q6H PRN Tr Fagan, MD      oxybutynin  5 mg Oral Daily Tr Fagan, MD      oxyCODONE  5 mg Oral Q4H PRN Tr Fagan, MD      pantoprazole  40 mg Oral Daily Tr Fagan, MD      polyethylene glycol  17 g Oral Daily Tr Fagan, MD      tamsulosin  0.4 mg Oral Daily With Shawnee Moreau MD          Today, Patient Was Seen By: Sudhir Rg M.D.     ** Please Note: Dictation voice to text software may have been used in the creation of this document.  **

## 2023-10-14 NOTE — PLAN OF CARE
Problem: Potential for Falls  Goal: Patient will remain free of falls  Description: INTERVENTIONS:  - Educate patient/family on patient safety including physical limitations  - Instruct patient to call for assistance with activity   - Consult OT/PT to assist with strengthening/mobility   - Keep Call bell within reach  - Keep bed low and locked with side rails adjusted as appropriate  - Keep care items and personal belongings within reach  - Initiate and maintain comfort rounds  - Make Fall Risk Sign visible to staff  - Offer Toileting every  Hours, in advance of need  - Initiate/Maintain alarm  - Obtain necessary fall risk management equipment:   - Apply yellow socks and bracelet for high fall risk patients  - Consider moving patient to room near nurses station  Outcome: Progressing     Problem: PAIN - ADULT  Goal: Verbalizes/displays adequate comfort level or baseline comfort level  Description: Interventions:  - Encourage patient to monitor pain and request assistance  - Assess pain using appropriate pain scale  - Administer analgesics based on type and severity of pain and evaluate response  - Implement non-pharmacological measures as appropriate and evaluate response  - Consider cultural and social influences on pain and pain management  - Notify physician/advanced practitioner if interventions unsuccessful or patient reports new pain  Outcome: Progressing     Problem: INFECTION - ADULT  Goal: Absence or prevention of progression during hospitalization  Description: INTERVENTIONS:  - Assess and monitor for signs and symptoms of infection  - Monitor lab/diagnostic results  - Monitor all insertion sites, i.e. indwelling lines, tubes, and drains  - Monitor endotracheal if appropriate and nasal secretions for changes in amount and color  - [place] appropriate cooling/warming therapies per order  - Administer medications as ordered  - Instruct and encourage patient and family to use good hand hygiene technique  - Identify and instruct in appropriate isolation precautions for identified infection/condition  Outcome: Progressing  Goal: Absence of fever/infection during neutropenic period  Description: INTERVENTIONS:  - Monitor WBC    Outcome: Progressing     Problem: SAFETY ADULT  Goal: Patient will remain free of falls  Description: INTERVENTIONS:  - Educate patient/family on patient safety including physical limitations  - Instruct patient to call for assistance with activity   - Consult OT/PT to assist with strengthening/mobility   - Keep Call bell within reach  - Keep bed low and locked with side rails adjusted as appropriate  - Keep care items and personal belongings within reach  - Initiate and maintain comfort rounds  - Make Fall Risk Sign visible to staff  - Offer Toileting every  Hours, in advance of need  - Initiate/Maintain alarm  - Obtain necessary fall risk management equipment:   - Apply yellow socks and bracelet for high fall risk patients  - Consider moving patient to room near nurses station  Outcome: Progressing  Goal: Maintain or return to baseline ADL function  Description: INTERVENTIONS:  -  Assess patient's ability to carry out ADLs; assess patient's baseline for ADL function and identify physical deficits which impact ability to perform ADLs (bathing, care of mouth/teeth, toileting, grooming, dressing, etc.)  - Assess/evaluate cause of self-care deficits   - Assess range of motion  - Assess patient's mobility; develop plan if impaired  - Assess patient's need for assistive devices and provide as appropriate  - Encourage maximum independence but intervene and supervise when necessary  - Involve family in performance of ADLs  - Assess for home care needs following discharge   - Consider OT consult to assist with ADL evaluation and planning for discharge  - Provide patient education as appropriate  Outcome: Progressing  Goal: Maintains/Returns to pre admission functional level  Description: INTERVENTIONS:  - Perform BMAT or MOVE assessment daily.   - Set and communicate daily mobility goal to care team and patient/family/caregiver. - Collaborate with rehabilitation services on mobility goals if consulted  - Perform Range of Motion  times a day. - Reposition patient every  hours. - Dangle patient  times a day  - Stand patient  times a day  - Ambulate patient  times a day  - Out of bed to chair  times a day   - Out of bed for meals  times a day  - Out of bed for toileting  - Record patient progress and toleration of activity level   Outcome: Progressing     Problem: DISCHARGE PLANNING  Goal: Discharge to home or other facility with appropriate resources  Description: INTERVENTIONS:  - Identify barriers to discharge w/patient and caregiver  - Arrange for needed discharge resources and transportation as appropriate  - Identify discharge learning needs (meds, wound care, etc.)  - Arrange for interpretive services to assist at discharge as needed  - Refer to Case Management Department for coordinating discharge planning if the patient needs post-hospital services based on physician/advanced practitioner order or complex needs related to functional status, cognitive ability, or social support system  Outcome: Progressing     Problem: Knowledge Deficit  Goal: Patient/family/caregiver demonstrates understanding of disease process, treatment plan, medications, and discharge instructions  Description: Complete learning assessment and assess knowledge base.   Interventions:  - Provide teaching at level of understanding  - Provide teaching via preferred learning methods  Outcome: Progressing     Problem: Prexisting or High Potential for Compromised Skin Integrity  Goal: Skin integrity is maintained or improved  Description: INTERVENTIONS:  - Identify patients at risk for skin breakdown  - Assess and monitor skin integrity  - Assess and monitor nutrition and hydration status  - Monitor labs   - Assess for incontinence   - Turn and reposition patient  - Assist with mobility/ambulation  - Relieve pressure over bony prominences  - Avoid friction and shearing  - Provide appropriate hygiene as needed including keeping skin clean and dry  - Evaluate need for skin moisturizer/barrier cream  - Collaborate with interdisciplinary team   - Patient/family teaching  - Consider wound care consult   Outcome: Progressing

## 2023-10-14 NOTE — PLAN OF CARE
Problem: Potential for Falls  Goal: Patient will remain free of falls  Description: INTERVENTIONS:  - Educate patient/family on patient safety including physical limitations  - Instruct patient to call for assistance with activity   - Consult OT/PT to assist with strengthening/mobility   - Keep Call bell within reach  - Keep bed low and locked with side rails adjusted as appropriate  - Keep care items and personal belongings within reach  - Initiate and maintain comfort rounds  - Make Fall Risk Sign visible to staff  - Offer Toileting every  Hours, in advance of need  - Initiate/Maintainalarm  - Obtain necessary fall risk management equipment:   - Apply yellow socks and bracelet for high fall risk patients  - Consider moving patient to room near nurses station  Outcome: Progressing     Problem: PAIN - ADULT  Goal: Verbalizes/displays adequate comfort level or baseline comfort level  Description: Interventions:  - Encourage patient to monitor pain and request assistance  - Assess pain using appropriate pain scale  - Administer analgesics based on type and severity of pain and evaluate response  - Implement non-pharmacological measures as appropriate and evaluate response  - Consider cultural and social influences on pain and pain management  - Notify physician/advanced practitioner if interventions unsuccessful or patient reports new pain  Outcome: Progressing     Problem: INFECTION - ADULT  Goal: Absence or prevention of progression during hospitalization  Description: INTERVENTIONS:  - Assess and monitor for signs and symptoms of infection  - Monitor lab/diagnostic results  - Monitor all insertion sites, i.e. indwelling lines, tubes, and drains  - Monitor endotracheal if appropriate and nasal secretions for changes in amount and color  - [place] appropriate cooling/warming therapies per order  - Administer medications as ordered  - Instruct and encourage patient and family to use good hand hygiene technique  - Identify and instruct in appropriate isolation precautions for identified infection/condition  Outcome: Progressing  Goal: Absence of fever/infection during neutropenic period  Description: INTERVENTIONS:  - Monitor WBC    Outcome: Progressing     Problem: SAFETY ADULT  Goal: Patient will remain free of falls  Description: INTERVENTIONS:  - Educate patient/family on patient safety including physical limitations  - Instruct patient to call for assistance with activity   - Consult OT/PT to assist with strengthening/mobility   - Keep Call bell within reach  - Keep bed low and locked with side rails adjusted as appropriate  - Keep care items and personal belongings within reach  - Initiate and maintain comfort rounds  - Make Fall Risk Sign visible to staff  - Offer Toileting every  Hours, in advance of need  - Initiate/Maintain alarm  - Obtain necessary fall risk management equipment:  - Apply yellow socks and bracelet for high fall risk patients  - Consider moving patient to room near nurses station  Outcome: Progressing  Goal: Maintain or return to baseline ADL function  Description: INTERVENTIONS:  -  Assess patient's ability to carry out ADLs; assess patient's baseline for ADL function and identify physical deficits which impact ability to perform ADLs (bathing, care of mouth/teeth, toileting, grooming, dressing, etc.)  - Assess/evaluate cause of self-care deficits   - Assess range of motion  - Assess patient's mobility; develop plan if impaired  - Assess patient's need for assistive devices and provide as appropriate  - Encourage maximum independence but intervene and supervise when necessary  - Involve family in performance of ADLs  - Assess for home care needs following discharge   - Consider OT consult to assist with ADL evaluation and planning for discharge  - Provide patient education as appropriate  Outcome: Progressing  Goal: Maintains/Returns to pre admission functional level  Description: INTERVENTIONS:  - Perform BMAT or MOVE assessment daily.   - Set and communicate daily mobility goal to care team and patient/family/caregiver. - Collaborate with rehabilitation services on mobility goals if consulted  - Perform Range of Motion times a day. - Reposition patient every  hours. - Dangle patient  times a day  - Stand patient  times a day  - Ambulate patient times a day  - Out of bed to chair  times a day   - Out of bed for meals times a day  - Out of bed for toileting  - Record patient progress and toleration of activity level   Outcome: Progressing     Problem: DISCHARGE PLANNING  Goal: Discharge to home or other facility with appropriate resources  Description: INTERVENTIONS:  - Identify barriers to discharge w/patient and caregiver  - Arrange for needed discharge resources and transportation as appropriate  - Identify discharge learning needs (meds, wound care, etc.)  - Arrange for interpretive services to assist at discharge as needed  - Refer to Case Management Department for coordinating discharge planning if the patient needs post-hospital services based on physician/advanced practitioner order or complex needs related to functional status, cognitive ability, or social support system  Outcome: Progressing     Problem: Knowledge Deficit  Goal: Patient/family/caregiver demonstrates understanding of disease process, treatment plan, medications, and discharge instructions  Description: Complete learning assessment and assess knowledge base.   Interventions:  - Provide teaching at level of understanding  - Provide teaching via preferred learning methods  Outcome: Progressing     Problem: Prexisting or High Potential for Compromised Skin Integrity  Goal: Skin integrity is maintained or improved  Description: INTERVENTIONS:  - Identify patients at risk for skin breakdown  - Assess and monitor skin integrity  - Assess and monitor nutrition and hydration status  - Monitor labs   - Assess for incontinence   - Turn and reposition patient  - Assist with mobility/ambulation  - Relieve pressure over bony prominences  - Avoid friction and shearing  - Provide appropriate hygiene as needed including keeping skin clean and dry  - Evaluate need for skin moisturizer/barrier cream  - Collaborate with interdisciplinary team   - Patient/family teaching  - Consider wound care consult   Outcome: Progressing

## 2023-10-15 VITALS
WEIGHT: 220.02 LBS | DIASTOLIC BLOOD PRESSURE: 62 MMHG | BODY MASS INDEX: 27.36 KG/M2 | TEMPERATURE: 97.6 F | SYSTOLIC BLOOD PRESSURE: 136 MMHG | RESPIRATION RATE: 18 BRPM | HEIGHT: 75 IN | HEART RATE: 65 BPM | OXYGEN SATURATION: 99 %

## 2023-10-15 LAB
GLUCOSE SERPL-MCNC: 100 MG/DL (ref 65–140)
GLUCOSE SERPL-MCNC: 272 MG/DL (ref 65–140)

## 2023-10-15 PROCEDURE — 99239 HOSP IP/OBS DSCHRG MGMT >30: CPT | Performed by: FAMILY MEDICINE

## 2023-10-15 PROCEDURE — 82948 REAGENT STRIP/BLOOD GLUCOSE: CPT

## 2023-10-15 RX ORDER — LIDOCAINE 50 MG/G
1 PATCH TOPICAL DAILY
Qty: 10 PATCH | Refills: 0 | Status: SHIPPED | OUTPATIENT
Start: 2023-10-15 | End: 2023-10-25

## 2023-10-15 RX ORDER — INSULIN DEGLUDEC 200 U/ML
36 INJECTION, SOLUTION SUBCUTANEOUS
Start: 2023-10-15

## 2023-10-15 RX ORDER — OXYCODONE HYDROCHLORIDE 5 MG/1
5 TABLET ORAL EVERY 4 HOURS PRN
Qty: 10 TABLET | Refills: 0 | Status: SHIPPED | OUTPATIENT
Start: 2023-10-15

## 2023-10-15 RX ADMIN — CLOPIDOGREL BISULFATE 75 MG: 75 TABLET ORAL at 09:00

## 2023-10-15 RX ADMIN — INSULIN LISPRO 8 UNITS: 100 INJECTION, SOLUTION INTRAVENOUS; SUBCUTANEOUS at 08:00

## 2023-10-15 RX ADMIN — AMLODIPINE BESYLATE 5 MG: 5 TABLET ORAL at 09:00

## 2023-10-15 RX ADMIN — ATORVASTATIN CALCIUM 20 MG: 20 TABLET, FILM COATED ORAL at 09:00

## 2023-10-15 RX ADMIN — ACETAMINOPHEN 650 MG: 325 TABLET, FILM COATED ORAL at 05:16

## 2023-10-15 RX ADMIN — MENTHOL, METHYL SALICYLATE: 10; 15 CREAM TOPICAL at 06:17

## 2023-10-15 RX ADMIN — OXYCODONE HYDROCHLORIDE 5 MG: 5 TABLET ORAL at 10:32

## 2023-10-15 RX ADMIN — OXYCODONE HYDROCHLORIDE 5 MG: 5 TABLET ORAL at 05:16

## 2023-10-15 RX ADMIN — LIDOCAINE 1 PATCH: 50 PATCH CUTANEOUS at 09:00

## 2023-10-15 RX ADMIN — INSULIN LISPRO 8 UNITS: 100 INJECTION, SOLUTION INTRAVENOUS; SUBCUTANEOUS at 12:00

## 2023-10-15 RX ADMIN — DICLOFENAC SODIUM 2 G: 10 GEL TOPICAL at 09:00

## 2023-10-15 RX ADMIN — CEPHALEXIN 500 MG: 500 CAPSULE ORAL at 09:00

## 2023-10-15 RX ADMIN — PANTOPRAZOLE SODIUM 40 MG: 40 TABLET, DELAYED RELEASE ORAL at 09:00

## 2023-10-15 RX ADMIN — OXYBUTYNIN CHLORIDE 5 MG: 5 TABLET, EXTENDED RELEASE ORAL at 09:00

## 2023-10-15 RX ADMIN — POLYETHYLENE GLYCOL 3350 17 G: 17 POWDER, FOR SOLUTION ORAL at 09:00

## 2023-10-15 RX ADMIN — GABAPENTIN 300 MG: 300 CAPSULE ORAL at 09:00

## 2023-10-15 RX ADMIN — FINASTERIDE 5 MG: 5 TABLET, FILM COATED ORAL at 09:00

## 2023-10-15 RX ADMIN — INSULIN LISPRO 4 UNITS: 100 INJECTION, SOLUTION INTRAVENOUS; SUBCUTANEOUS at 12:00

## 2023-10-15 NOTE — ASSESSMENT & PLAN NOTE
Lab Results   Component Value Date    HGBA1C 6.7 (H) 07/17/2023   Allergic to Lantus. At home On Tresiba 38 units at bedtime along with Humalog 8 units 3 times daily with meals.  Resumed on DC    **Please note patient has a right diabetic foot infection for which he is being treated with oral Keflex as an outpatient for 2 weeks by his podiatrist.

## 2023-10-15 NOTE — DISCHARGE SUMMARY
[address]  Discharge- Henok Balls 1955, 69 y.o. male MRN: [id number]  Unit/Bed#: MS [facility] Encounter: [id number]  Primary Care Provider: Yasmeen Grimm MD   Date and time admitted to hospital: 10/13/2023  3:43 PM    * [facility] with 1 episode of fall while getting out of car. Denied any symptoms prior to fall. Denies lightheadedness, palpitations, dizziness, vertigo, visual disturbances. Has history of left BKA states he was taking out his legs somehow he slipped and fell down. Trauma work-up reveals left anterior/lateral 4th-7th nondispl rib fractures  Prn pain control as below    Closed fracture of multiple ribs of left side  Assessment & Plan  Noted on imaging. Continue as needed Tylenol/oxycodone/Voltaren gel/Bengay cream/lidocaine patch  Continue with incentive spirometry  Currently not hypoxic. Type 2 diabetes mellitus with foot ulcer ([address])  Assessment & Plan  Lab Results   Component Value Date    HGBA1C 6.7 (H) 07/17/2023   Allergic to Lantus. At home On Tresiba 38 units at bedtime along with Humalog 8 units 3 times daily with meals. Resumed on DC    **Please note patient has a right diabetic foot infection for which he is being treated with oral Keflex as an outpatient for 2 weeks by his podiatrist.       Hx of BKA, left [facility])  Assessment & Plan  Noted      Discharging Physician / Practitioner: Tommy Bernal MD  PCP: Yasmeen Grimm MD  Admission Date:   Admission Orders (From admission, onward)       Ordered        10/14/23 1336  Inpatient Admission  Once            10/13/23 1916  Place in Observation  Once                          Discharge Date: 10/15/23    Disposition:      home    Reason for Admission: fall, rib fractures    Discharge Diagnoses:     Please see assessment and plan section above for further details regarding discharge diagnoses.      Medical Problems       Resolved Problems  Date Reviewed: 10/13/2023   None Medication Adjustments and Discharge Medications:  Summary of Medication Adjustments made as a result of this hospitalization: see [facility] rec  Medication Dosing Tapers - Please refer to Discharge Medication List for details on any medication dosing tapers (if applicable to patient). Medications being temporarily held (include recommended restart time): see med rec  Discharge Medication List: See after visit summary for reconciled discharge medications. Diet Recommendations at Discharge:  Diet -        Diet Orders   (From admission, onward)                 Start     Ordered    10/14/23 1619  Dietary nutrition supplements  Once        Question Answer Comment   Select Supplement: Lazaro-Fruit Punch    Mix with: 8 oz Water    Frequency Breakfast, Dinner        10/14/23 1618    10/14/23 1536  Diet Cardiovascular; Cardiac; Consistent Carbohydrate Diet Level 2 (5 carb servings/75 grams CHO/meal)  Diet effective now        References:    Adult Nutrition Support Algorithm    RD Therapeutic Diet Order Protocol   Question Answer Comment   Diet Type Cardiovascular    Cardiac Cardiac    Other Restriction(s): Consistent Carbohydrate Diet Level 2 (5 carb servings/75 grams CHO/meal)    RD to adjust diet per protocol? Yes        10/14/23 16566 [address] Course:     Eleni Haider is a 69 y.o. male patient who originally presented to the hospital on 10/13/2023 due to fall. Patient with underlying history of diabetes hypertension BPH who presented after mechanical fall describing symptoms of chest pain. On arrival, trauma work-up revealing left anterior/lateral 4th-7th nondisplaced rib fractures. He was not hypoxic. Started on Tylenol oxycodone Voltaren gel Bengay cream and lidocaine patch with as needed Dilaudid. Patient required only 2 doses of Dilaudid, last used 3 PM yesterday. He reports pain is much better controlled this morning. Continue with incentive spirometry.    He verbalizes understanding of plan above and is in agreement with the same. DC home. Condition at Discharge: fair     Discharge Day Visit / Exam:     Vitals: Blood Pressure: 136/62 (10/15/23 0713)  Pulse: 65 (10/15/23 0713)  Temperature: 97.6 °F (36.4 °C) (10/15/23 0713)  Temp Source: Oral (10/15/23 0713)  Respirations: 18 (10/15/23 0713)  Height: 6' 3" (190.5 cm) (10/14/23 1551)  Weight - Scale: 99.8 kg (220 lb 0.3 oz) (10/14/23 1551)  SpO2: 99 % (10/15/23 0713)  Exam:   Physical Exam  Constitutional:       General: He is not in acute distress. Appearance: He is normal weight. He is not toxic-appearing. HENT:      Mouth/Throat:      Mouth: Mucous membranes are moist.      Pharynx: Oropharynx is clear. Cardiovascular:      Rate and Rhythm: Normal rate and regular rhythm. Pulses: Normal pulses. Pulmonary:      Effort: Pulmonary effort is normal. No respiratory distress. Breath sounds: Normal breath sounds. Abdominal:      General: Abdomen is flat. Bowel sounds are normal.      Palpations: Abdomen is soft. Musculoskeletal:      Comments: Left BKA   Neurological:      Mental Status: He is alert and oriented to person, place, and time. Goals of Care Discussions:  Code Status at Discharge: Level 3 - DNAR and DNI    Discharge instructions/Information to patient and family:   See after visit summary section titled Discharge Instructions for information provided to patient and family. Discharge Statement:  I spent 35 minutes discharging the patient. This time was spent on the day of discharge. I had direct contact with the patient on the day of discharge. Greater than 50% of the total time was spent examining patient, answering all patient questions, arranging and discussing plan of care with patient as well as directly providing post-discharge instructions. Additional time then spent on discharge activities.     ** Please Note: This note has been constructed using a voice recognition system **

## 2023-10-15 NOTE — PLAN OF CARE
Problem: PAIN - ADULT  Goal: Verbalizes/displays adequate comfort level or baseline comfort level  Description: Interventions:  - Encourage patient to monitor pain and request assistance  - Assess pain using appropriate pain scale  - Administer analgesics based on type and severity of pain and evaluate response  - Implement non-pharmacological measures as appropriate and evaluate response  - Consider cultural and social influences on pain and pain management  - Notify physician/advanced practitioner if interventions unsuccessful or patient reports new pain  Outcome: Progressing     Problem: SAFETY ADULT  Goal: Patient will remain free of falls  Description: INTERVENTIONS:  - Educate patient/family on patient safety including physical limitations  - Instruct patient to call for assistance with activity   - Consult OT/PT to assist with strengthening/mobility   - Keep Call bell within reach  - Keep bed low and locked with side rails adjusted as appropriate  - Keep care items and personal belongings within reach  - Initiate and maintain comfort rounds  - Make Fall Risk Sign visible to staff  - Offer Toileting every 2 Hours, in advance of need  - Initiate/Maintain bed alarm  - Apply yellow socks and bracelet for high fall risk patients  - Consider moving patient to room near nurses station  Outcome: Progressing  Goal: Maintain or return to baseline ADL function  Description: INTERVENTIONS:  -  Assess patient's ability to carry out ADLs; assess patient's baseline for ADL function and identify physical deficits which impact ability to perform ADLs (bathing, care of mouth/teeth, toileting, grooming, dressing, etc.)  - Assess/evaluate cause of self-care deficits   - Assess range of motion  - Assess patient's mobility; develop plan if impaired  - Assess patient's need for assistive devices and provide as appropriate  - Encourage maximum independence but intervene and supervise when necessary  - Involve family in performance of ADLs  - Assess for home care needs following discharge   - Consider OT consult to assist with ADL evaluation and planning for discharge  - Provide patient education as appropriate  Outcome: Progressing  Goal: Maintains/Returns to pre admission functional level  Description: INTERVENTIONS:  - Perform BMAT or MOVE assessment daily.   - Set and communicate daily mobility goal to care team and patient/family/caregiver. - Collaborate with rehabilitation services on mobility goals if consulted  - Perform Range of Motion 4 times a day. - Reposition patient every 2 hours. - Dangle patient 3 times a day  - Stand patient 4 times a day  - Ambulate patient 4 times a day  - Out of bed to chair 3 times a day   - Out of bed for meals 3 times a day  - Out of bed for toileting  - Record patient progress and toleration of activity level   Outcome: Progressing     Problem: DISCHARGE PLANNING  Goal: Discharge to home or other facility with appropriate resources  Description: INTERVENTIONS:  - Identify barriers to discharge w/patient and caregiver  - Arrange for needed discharge resources and transportation as appropriate  - Identify discharge learning needs (meds, wound care, etc.)  - Arrange for interpretive services to assist at discharge as needed  - Refer to Case Management Department for coordinating discharge planning if the patient needs post-hospital services based on physician/advanced practitioner order or complex needs related to functional status, cognitive ability, or social support system  Outcome: Progressing     Problem: Knowledge Deficit  Goal: Patient/family/caregiver demonstrates understanding of disease process, treatment plan, medications, and discharge instructions  Description: Complete learning assessment and assess knowledge base.   Interventions:  - Provide teaching at level of understanding  - Provide teaching via preferred learning methods  Outcome: Progressing

## 2023-10-15 NOTE — ASSESSMENT & PLAN NOTE
Noted on imaging. Continue as needed Tylenol/oxycodone/Voltaren gel/Bengay cream/lidocaine patch  Continue with incentive spirometry  Currently not hypoxic.

## 2023-10-25 ENCOUNTER — TELEPHONE (OUTPATIENT)
Age: 68
End: 2023-10-25

## 2023-10-26 NOTE — TELEPHONE ENCOUNTER
Patient of Dr. Shelbie Julian at [facility]    Patient's daughter called stating patient was not able to do psa for tomorrow's appointment. She will stop by to  psa order. Patient goes to [address]   Patient rescheduled for 11/17/23 with Eyad Beasley at 1 pm.    Patient's daughter Shree Lopez can be reached at [phone]
Patient's daughter came to the office to  a copy of PSA order
Awake

## 2023-11-06 ENCOUNTER — APPOINTMENT (OUTPATIENT)
Dept: LAB | Facility: CLINIC | Age: 68
End: 2023-11-06
Payer: MEDICARE

## 2023-11-06 DIAGNOSIS — Z12.5 PROSTATE CANCER SCREENING: ICD-10-CM

## 2023-11-06 LAB — PSA SERPL-MCNC: 1.17 NG/ML (ref 0–4)

## 2023-11-06 PROCEDURE — G0103 PSA SCREENING: HCPCS

## 2023-11-06 PROCEDURE — 36415 COLL VENOUS BLD VENIPUNCTURE: CPT

## 2023-11-17 ENCOUNTER — OFFICE VISIT (OUTPATIENT)
Dept: UROLOGY | Facility: CLINIC | Age: 68
End: 2023-11-17
Payer: MEDICARE

## 2023-11-17 VITALS
HEIGHT: 75 IN | OXYGEN SATURATION: 98 % | BODY MASS INDEX: 28.82 KG/M2 | DIASTOLIC BLOOD PRESSURE: 70 MMHG | HEART RATE: 65 BPM | WEIGHT: 231.8 LBS | SYSTOLIC BLOOD PRESSURE: 138 MMHG

## 2023-11-17 DIAGNOSIS — N39.0 RECURRENT UTI: Primary | ICD-10-CM

## 2023-11-17 DIAGNOSIS — N40.1 BPH WITH OBSTRUCTION/LOWER URINARY TRACT SYMPTOMS: ICD-10-CM

## 2023-11-17 DIAGNOSIS — N13.8 BPH WITH OBSTRUCTION/LOWER URINARY TRACT SYMPTOMS: ICD-10-CM

## 2023-11-17 DIAGNOSIS — R33.9 URINARY RETENTION: ICD-10-CM

## 2023-11-17 DIAGNOSIS — N39.41 URGE URINARY INCONTINENCE: ICD-10-CM

## 2023-11-17 LAB
POST-VOID RESIDUAL VOLUME, ML POC: 14 ML
SL AMB  POCT GLUCOSE, UA: NORMAL
SL AMB LEUKOCYTE ESTERASE,UA: NORMAL
SL AMB POCT BILIRUBIN,UA: NORMAL
SL AMB POCT BLOOD,UA: NORMAL
SL AMB POCT CLARITY,UA: CLEAR
SL AMB POCT COLOR,UA: NORMAL
SL AMB POCT KETONES,UA: NORMAL
SL AMB POCT NITRITE,UA: NORMAL
SL AMB POCT PH,UA: 5
SL AMB POCT SPECIFIC GRAVITY,UA: 1.03
SL AMB POCT URINE PROTEIN: NORMAL
SL AMB POCT UROBILINOGEN: 0.2

## 2023-11-17 PROCEDURE — 99213 OFFICE O/P EST LOW 20 MIN: CPT | Performed by: PHYSICIAN ASSISTANT

## 2023-11-17 PROCEDURE — 81002 URINALYSIS NONAUTO W/O SCOPE: CPT | Performed by: PHYSICIAN ASSISTANT

## 2023-11-17 PROCEDURE — 51798 US URINE CAPACITY MEASURE: CPT | Performed by: PHYSICIAN ASSISTANT

## 2023-11-17 RX ORDER — FINASTERIDE 5 MG/1
5 TABLET, FILM COATED ORAL DAILY
Qty: 90 TABLET | Refills: 3 | Status: SHIPPED | OUTPATIENT
Start: 2023-11-17

## 2023-11-17 RX ORDER — ALFUZOSIN HYDROCHLORIDE 10 MG/1
10 TABLET, EXTENDED RELEASE ORAL DAILY
Qty: 90 TABLET | Refills: 3 | Status: SHIPPED | OUTPATIENT
Start: 2023-11-17

## 2023-11-17 RX ORDER — TOLTERODINE 2 MG/1
2 CAPSULE, EXTENDED RELEASE ORAL DAILY
Qty: 90 CAPSULE | Refills: 3 | Status: SHIPPED | OUTPATIENT
Start: 2023-11-17

## 2023-11-17 NOTE — PROGRESS NOTES
11/17/2023      Chief Complaint   Patient presents with    Follow-up     Recurrent UTI         Assessment and Plan    77 y.o. male     1. Recurrent UTIs  - UA today negative  -Recommend good glycemic control.   - Recommend increased hydration, avoiding constipation, cranberry supplementation and probiotics for UTI prevention    2. BPH with LTUS  3. Urge urinary incontinence  - Cystoscopy TRUS (10/11/23) showing obstruction  - Continue alfuzosin and finasteride dual medical therapy  - Continue Detrol  - PVR today 14 mL  - Follow up with Dr. Lise Montgomery to discuss surgical interventions in future if he chooses  - Follow up in 1 year for symptom reassessment    4. 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 Prostate cancer screening  6. Family history of prostate cancer, brother  - PSA (11/6/23) 1.17  - SEGUNDO in September benign  - Call with any questions or concerns in the meantime  - All questions answered; patient understands and agrees with plan       History of Present Illness  Dwight Escobar is a 77 y.o. male patient with history of recurrent UTI, BPH with LUTS, urge urinary incontinence, history of left radical orchiectomy in 2021 with outside urologist here for follow up. Patient has not had UTI since last visit. Denies gross hematuria. Cystoscopy and TRUS did show obstruction. Continues to take dual therapy as well as detrol. Most recent PSA 1.17. Review of Systems   Constitutional:  Negative for activity change, appetite change, chills and fever. HENT:  Negative for congestion and trouble swallowing. Respiratory:  Negative for cough and shortness of breath. Cardiovascular:  Negative for chest pain, palpitations and leg swelling. Gastrointestinal:  Negative for abdominal pain, constipation, diarrhea, nausea and vomiting.    Genitourinary:  Negative for difficulty urinating, dysuria, flank pain, frequency, hematuria and urgency. Musculoskeletal:  Negative for back pain and gait problem. Skin:  Negative for wound. Allergic/Immunologic: Negative for immunocompromised state. Neurological:  Negative for dizziness and syncope. Hematological:  Does not bruise/bleed easily. Psychiatric/Behavioral:  Negative for confusion. All other systems reviewed and are negative. Vitals  Vitals:    11/17/23 1258   BP: 138/70   Pulse: 65   SpO2: 98%   Weight: 105 kg (231 lb 12.8 oz)   Height: 6' 3" (1.905 m)       Physical Exam  Constitutional:       General: He is not in acute distress. Appearance: Normal appearance. He is not ill-appearing, toxic-appearing or diaphoretic. HENT:      Head: Normocephalic. Nose: No congestion. Eyes:      General: No scleral icterus. Right eye: No discharge. Left eye: No discharge. Conjunctiva/sclera: Conjunctivae normal.      Pupils: Pupils are equal, round, and reactive to light. Pulmonary:      Effort: Pulmonary effort is normal.   Musculoskeletal:      Cervical back: Normal range of motion. Skin:     General: Skin is warm and dry. Coloration: Skin is not jaundiced or pale. Findings: No bruising, erythema, lesion or rash. Neurological:      General: No focal deficit present. Mental Status: He is alert and oriented to person, place, and time. Mental status is at baseline. Gait: Gait normal.   Psychiatric:         Mood and Affect: Mood normal.         Behavior: Behavior normal.         Thought Content:  Thought content normal.         Judgment: Judgment normal.           Past History  Past Medical History:   Diagnosis Date    BPH (benign prostatic hypertrophy) 10 /2020    Chronic kidney disease 8/2020    Diabetes mellitus ([facility])     Foot ulcer due to secondary DM ([facility])     Hypertension 4 /2021    Urinary incontinence 8/2022    Urinary tract infection 4/2023     Social History     Socioeconomic History    Marital status: /Civil [employer]     Spouse name: None    Number of children: None    Years of education: None    Highest education level: None   Occupational History    None   Tobacco Use    Smoking status: Former     Packs/day: 1.50     Years: 40.00     Total pack years: 60.00     Types: Cigarettes, Pipe     Start date: 1969     Quit date: 2009     Years since quittin.8     Passive exposure: Past    Smokeless tobacco: Never    Tobacco comments:     already have   Vaping Use    Vaping Use: Never used   Substance and Sexual Activity    Alcohol use: Not Currently    Drug use: Never    Sexual activity: Not Currently     Partners: Female     Birth control/protection: None   Other Topics Concern    None   Social History Narrative    None     Social Determinants of Health     Financial Resource Strain: Not on file   Food Insecurity: No Food Insecurity (2023)    Hunger Vital Sign     Worried About Running Out of Food in the Last Year: Never true     Ran Out of Food in the Last Year: Never true   Transportation Needs: No Transportation Needs (2023)    PRAPARE - Transportation     Lack of Transportation (Medical): No     Lack of Transportation (Non-Medical):  No   Physical Activity: Not on file   Stress: Not on file   Social Connections: Not on file   Intimate Partner Violence: Not on file   Housing Stability: Low Risk  (2023)    Housing Stability Vital Sign     Unable to Pay for Housing in the Last Year: No     Number of Places Lived in the Last Year: 1     Unstable Housing in the Last Year: No     Social History     Tobacco Use   Smoking Status Former    Packs/day: 1.50    Years: 40.00    Total pack years: 60.00    Types: Cigarettes, Pipe    Start date: 1969    Quit date: 2009    Years since quittin.8    Passive exposure: Past   Smokeless Tobacco Never   Tobacco Comments    already have     Family History   Problem Relation Age of Onset    Heart attack Father     Heart disease Father          of massive heart attack    Cancer Mother          stomach cancer    Diabetes Mother     No Known Problems Brother     No Known Problems Daughter        The following portions of the patient's history were reviewed and updated as appropriate: allergies, current medications, past medical history, past social history, past surgical history and problem list.    Results  No results found for this or any previous visit (from the past 1 hour(s)).]  Lab Results   Component Value Date    PSA 1.17 2023     Lab Results   Component Value Date    CALCIUM 8.4 10/14/2023    K 4.4 10/14/2023    CO2 25 10/14/2023     10/14/2023    BUN 20 10/14/2023    CREATININE 1.11 10/14/2023     Lab Results   Component Value Date    WBC 7.68 10/14/2023    HGB 9.0 (L) 10/14/2023    HCT 31.2 (L) 10/14/2023    MCV 77 (L) 10/14/2023     10/14/2023       Chino Peterson PA-C

## 2023-12-08 ENCOUNTER — APPOINTMENT (EMERGENCY)
Dept: RADIOLOGY | Facility: HOSPITAL | Age: 68
DRG: 392 | End: 2023-12-08
Payer: MEDICARE

## 2023-12-08 ENCOUNTER — HOSPITAL ENCOUNTER (INPATIENT)
Facility: HOSPITAL | Age: 68
LOS: 1 days | Discharge: HOME | DRG: 392 | End: 2023-12-09
Attending: EMERGENCY MEDICINE | Admitting: STUDENT IN AN ORGANIZED HEALTH CARE EDUCATION/TRAINING PROGRAM
Payer: MEDICARE

## 2023-12-08 ENCOUNTER — APPOINTMENT (EMERGENCY)
Dept: CT IMAGING | Facility: HOSPITAL | Age: 68
DRG: 392 | End: 2023-12-08
Payer: MEDICARE

## 2023-12-08 ENCOUNTER — OFFICE VISIT (OUTPATIENT)
Dept: UROLOGY | Facility: CLINIC | Age: 68
End: 2023-12-08
Payer: MEDICARE

## 2023-12-08 VITALS
WEIGHT: 233 LBS | BODY MASS INDEX: 28.97 KG/M2 | HEIGHT: 75 IN | HEART RATE: 74 BPM | SYSTOLIC BLOOD PRESSURE: 140 MMHG | DIASTOLIC BLOOD PRESSURE: 80 MMHG | OXYGEN SATURATION: 94 %

## 2023-12-08 DIAGNOSIS — R10.31 RIGHT LOWER QUADRANT ABDOMINAL PAIN: ICD-10-CM

## 2023-12-08 DIAGNOSIS — N12 PYELONEPHRITIS: Primary | ICD-10-CM

## 2023-12-08 DIAGNOSIS — T14.8XXA MUSCLE STRAIN: ICD-10-CM

## 2023-12-08 DIAGNOSIS — R10.9 ACUTE FLANK PAIN: ICD-10-CM

## 2023-12-08 DIAGNOSIS — N39.0 RECURRENT UTI: ICD-10-CM

## 2023-12-08 PROBLEM — I10 PRIMARY HYPERTENSION: Status: ACTIVE | Noted: 2021-08-31

## 2023-12-08 PROBLEM — E78.5 HYPERLIPIDEMIA ASSOCIATED WITH TYPE 2 DIABETES MELLITUS: Status: ACTIVE | Noted: 2021-11-02

## 2023-12-08 PROBLEM — E11.40 DIABETIC NEUROPATHY (HCC): Status: ACTIVE | Noted: 2023-12-08

## 2023-12-08 PROBLEM — N30.00 ACUTE CYSTITIS WITHOUT HEMATURIA: Status: ACTIVE | Noted: 2023-12-08

## 2023-12-08 PROBLEM — E11.69 HYPERLIPIDEMIA ASSOCIATED WITH TYPE 2 DIABETES MELLITUS: Status: ACTIVE | Noted: 2021-11-02

## 2023-12-08 PROBLEM — E11.21 TYPE 2 DIABETES MELLITUS WITH DIABETIC NEPHROPATHY, WITH LONG-TERM CURRENT USE OF INSULIN (HCC): Status: ACTIVE | Noted: 2023-12-08

## 2023-12-08 PROBLEM — Z79.4 TYPE 2 DIABETES MELLITUS WITH DIABETIC NEPHROPATHY, WITH LONG-TERM CURRENT USE OF INSULIN (HCC): Status: ACTIVE | Noted: 2023-12-08

## 2023-12-08 LAB
2HR DELTA HS TROPONIN: 0 NG/L
4HR DELTA HS TROPONIN: -1 NG/L
ALBUMIN SERPL BCP-MCNC: 4 G/DL (ref 3.5–5)
ALP SERPL-CCNC: 59 U/L (ref 34–104)
ALT SERPL W P-5'-P-CCNC: 8 U/L (ref 7–52)
ANION GAP SERPL CALCULATED.3IONS-SCNC: 5 MMOL/L
AST SERPL W P-5'-P-CCNC: 9 U/L (ref 13–39)
BACTERIA UR QL AUTO: ABNORMAL /HPF
BASOPHILS # BLD AUTO: 0.04 THOUSANDS/ÂΜL (ref 0–0.1)
BASOPHILS NFR BLD AUTO: 1 % (ref 0–1)
BILIRUB SERPL-MCNC: 0.29 MG/DL (ref 0.2–1)
BILIRUB UR QL STRIP: NEGATIVE
BUN SERPL-MCNC: 29 MG/DL (ref 5–25)
CALCIUM SERPL-MCNC: 9 MG/DL (ref 8.4–10.2)
CARDIAC TROPONIN I PNL SERPL HS: 2 NG/L
CARDIAC TROPONIN I PNL SERPL HS: 3 NG/L
CARDIAC TROPONIN I PNL SERPL HS: 3 NG/L
CHLORIDE SERPL-SCNC: 107 MMOL/L (ref 96–108)
CLARITY UR: CLEAR
CO2 SERPL-SCNC: 26 MMOL/L (ref 21–32)
COLOR UR: ABNORMAL
CREAT SERPL-MCNC: 1.18 MG/DL (ref 0.6–1.3)
D DIMER PPP FEU-MCNC: 0.49 UG/ML FEU
EOSINOPHIL # BLD AUTO: 0.17 THOUSAND/ÂΜL (ref 0–0.61)
EOSINOPHIL NFR BLD AUTO: 2 % (ref 0–6)
ERYTHROCYTE [DISTWIDTH] IN BLOOD BY AUTOMATED COUNT: 17.4 % (ref 11.6–15.1)
GFR SERPL CREATININE-BSD FRML MDRD: 62 ML/MIN/1.73SQ M
GLUCOSE SERPL-MCNC: 182 MG/DL (ref 65–140)
GLUCOSE UR STRIP-MCNC: ABNORMAL MG/DL
HCT VFR BLD AUTO: 30.7 % (ref 36.5–49.3)
HGB BLD-MCNC: 9.2 G/DL (ref 12–17)
HGB UR QL STRIP.AUTO: NEGATIVE
IMM GRANULOCYTES # BLD AUTO: 0.03 THOUSAND/UL (ref 0–0.2)
IMM GRANULOCYTES NFR BLD AUTO: 0 % (ref 0–2)
KETONES UR STRIP-MCNC: NEGATIVE MG/DL
LEUKOCYTE ESTERASE UR QL STRIP: ABNORMAL
LIPASE SERPL-CCNC: 6 U/L (ref 11–82)
LYMPHOCYTES # BLD AUTO: 1.35 THOUSANDS/ÂΜL (ref 0.6–4.47)
LYMPHOCYTES NFR BLD AUTO: 19 % (ref 14–44)
MCH RBC QN AUTO: 23.2 PG (ref 26.8–34.3)
MCHC RBC AUTO-ENTMCNC: 30 G/DL (ref 31.4–37.4)
MCV RBC AUTO: 78 FL (ref 82–98)
MONOCYTES # BLD AUTO: 0.63 THOUSAND/ÂΜL (ref 0.17–1.22)
MONOCYTES NFR BLD AUTO: 9 % (ref 4–12)
MUCOUS THREADS UR QL AUTO: ABNORMAL
NEUTROPHILS # BLD AUTO: 4.8 THOUSANDS/ÂΜL (ref 1.85–7.62)
NEUTS SEG NFR BLD AUTO: 69 % (ref 43–75)
NITRITE UR QL STRIP: NEGATIVE
NON-SQ EPI CELLS URNS QL MICRO: ABNORMAL /HPF
NRBC BLD AUTO-RTO: 0 /100 WBCS
PH UR STRIP.AUTO: 5.5 [PH]
PLATELET # BLD AUTO: 272 THOUSANDS/UL (ref 149–390)
PLATELET # BLD AUTO: 276 THOUSANDS/UL (ref 149–390)
PMV BLD AUTO: 10.4 FL (ref 8.9–12.7)
PMV BLD AUTO: 10.6 FL (ref 8.9–12.7)
POST-VOID RESIDUAL VOLUME, ML POC: 7 ML
POTASSIUM SERPL-SCNC: 4.6 MMOL/L (ref 3.5–5.3)
PROT SERPL-MCNC: 7 G/DL (ref 6.4–8.4)
PROT UR STRIP-MCNC: ABNORMAL MG/DL
RBC # BLD AUTO: 3.96 MILLION/UL (ref 3.88–5.62)
RBC #/AREA URNS AUTO: ABNORMAL /HPF
SL AMB  POCT GLUCOSE, UA: 15
SL AMB LEUKOCYTE ESTERASE,UA: NORMAL
SL AMB POCT BILIRUBIN,UA: 17
SL AMB POCT BLOOD,UA: NORMAL
SL AMB POCT CLARITY,UA: NORMAL
SL AMB POCT COLOR,UA: YELLOW
SL AMB POCT KETONES,UA: 0.5
SL AMB POCT NITRITE,UA: NORMAL
SL AMB POCT PH,UA: 5
SL AMB POCT SPECIFIC GRAVITY,UA: 1.02
SL AMB POCT URINE PROTEIN: 0.3
SL AMB POCT UROBILINOGEN: 3.5
SODIUM SERPL-SCNC: 138 MMOL/L (ref 135–147)
SP GR UR STRIP.AUTO: >=1.05 (ref 1–1.03)
UROBILINOGEN UR STRIP-ACNC: <2 MG/DL
WBC # BLD AUTO: 7.02 THOUSAND/UL (ref 4.31–10.16)
WBC #/AREA URNS AUTO: ABNORMAL /HPF

## 2023-12-08 PROCEDURE — 80053 COMPREHEN METABOLIC PANEL: CPT | Performed by: EMERGENCY MEDICINE

## 2023-12-08 PROCEDURE — 85049 AUTOMATED PLATELET COUNT: CPT

## 2023-12-08 PROCEDURE — 87086 URINE CULTURE/COLONY COUNT: CPT | Performed by: PHYSICIAN ASSISTANT

## 2023-12-08 PROCEDURE — 85025 COMPLETE CBC W/AUTO DIFF WBC: CPT | Performed by: EMERGENCY MEDICINE

## 2023-12-08 PROCEDURE — 74177 CT ABD & PELVIS W/CONTRAST: CPT

## 2023-12-08 PROCEDURE — 93005 ELECTROCARDIOGRAM TRACING: CPT

## 2023-12-08 PROCEDURE — 84484 ASSAY OF TROPONIN QUANT: CPT | Performed by: EMERGENCY MEDICINE

## 2023-12-08 PROCEDURE — 83690 ASSAY OF LIPASE: CPT | Performed by: EMERGENCY MEDICINE

## 2023-12-08 PROCEDURE — 51798 US URINE CAPACITY MEASURE: CPT | Performed by: PHYSICIAN ASSISTANT

## 2023-12-08 PROCEDURE — 99213 OFFICE O/P EST LOW 20 MIN: CPT | Performed by: PHYSICIAN ASSISTANT

## 2023-12-08 PROCEDURE — 36415 COLL VENOUS BLD VENIPUNCTURE: CPT | Performed by: EMERGENCY MEDICINE

## 2023-12-08 PROCEDURE — 71045 X-RAY EXAM CHEST 1 VIEW: CPT

## 2023-12-08 PROCEDURE — 99223 1ST HOSP IP/OBS HIGH 75: CPT | Performed by: STUDENT IN AN ORGANIZED HEALTH CARE EDUCATION/TRAINING PROGRAM

## 2023-12-08 PROCEDURE — 81002 URINALYSIS NONAUTO W/O SCOPE: CPT | Performed by: PHYSICIAN ASSISTANT

## 2023-12-08 PROCEDURE — G1004 CDSM NDSC: HCPCS

## 2023-12-08 PROCEDURE — 84484 ASSAY OF TROPONIN QUANT: CPT

## 2023-12-08 PROCEDURE — 96375 TX/PRO/DX INJ NEW DRUG ADDON: CPT

## 2023-12-08 PROCEDURE — 96365 THER/PROPH/DIAG IV INF INIT: CPT

## 2023-12-08 PROCEDURE — 87040 BLOOD CULTURE FOR BACTERIA: CPT | Performed by: EMERGENCY MEDICINE

## 2023-12-08 PROCEDURE — 99285 EMERGENCY DEPT VISIT HI MDM: CPT

## 2023-12-08 PROCEDURE — 81001 URINALYSIS AUTO W/SCOPE: CPT | Performed by: EMERGENCY MEDICINE

## 2023-12-08 PROCEDURE — 87086 URINE CULTURE/COLONY COUNT: CPT | Performed by: EMERGENCY MEDICINE

## 2023-12-08 PROCEDURE — 85379 FIBRIN DEGRADATION QUANT: CPT | Performed by: EMERGENCY MEDICINE

## 2023-12-08 RX ORDER — DIAZEPAM 5 MG/1
TABLET ORAL
COMMUNITY
Start: 2023-11-24

## 2023-12-08 RX ORDER — TAMSULOSIN HYDROCHLORIDE 0.4 MG/1
0.4 CAPSULE ORAL
Status: DISCONTINUED | OUTPATIENT
Start: 2023-12-09 | End: 2023-12-09 | Stop reason: HOSPADM

## 2023-12-08 RX ORDER — HEPARIN SODIUM 5000 [USP'U]/ML
5000 INJECTION, SOLUTION INTRAVENOUS; SUBCUTANEOUS EVERY 8 HOURS SCHEDULED
Status: DISCONTINUED | OUTPATIENT
Start: 2023-12-08 | End: 2023-12-09 | Stop reason: HOSPADM

## 2023-12-08 RX ORDER — GABAPENTIN 300 MG/1
300 CAPSULE ORAL 3 TIMES DAILY
Status: DISCONTINUED | OUTPATIENT
Start: 2023-12-08 | End: 2023-12-09 | Stop reason: HOSPADM

## 2023-12-08 RX ORDER — LISINOPRIL 2.5 MG/1
2.5 TABLET ORAL EVERY 24 HOURS
Status: DISCONTINUED | OUTPATIENT
Start: 2023-12-08 | End: 2023-12-09 | Stop reason: HOSPADM

## 2023-12-08 RX ORDER — ONDANSETRON 2 MG/ML
4 INJECTION INTRAMUSCULAR; INTRAVENOUS EVERY 6 HOURS PRN
Status: DISCONTINUED | OUTPATIENT
Start: 2023-12-08 | End: 2023-12-09 | Stop reason: HOSPADM

## 2023-12-08 RX ORDER — CLOPIDOGREL BISULFATE 75 MG/1
75 TABLET ORAL DAILY
Status: DISCONTINUED | OUTPATIENT
Start: 2023-12-09 | End: 2023-12-09 | Stop reason: HOSPADM

## 2023-12-08 RX ORDER — CEFTRIAXONE 1 G/50ML
1000 INJECTION, SOLUTION INTRAVENOUS ONCE
Status: COMPLETED | OUTPATIENT
Start: 2023-12-08 | End: 2023-12-08

## 2023-12-08 RX ORDER — SODIUM CHLORIDE 9 MG/ML
75 INJECTION, SOLUTION INTRAVENOUS CONTINUOUS
Status: DISCONTINUED | OUTPATIENT
Start: 2023-12-08 | End: 2023-12-09

## 2023-12-08 RX ORDER — DOCUSATE SODIUM 100 MG/1
100 CAPSULE, LIQUID FILLED ORAL 2 TIMES DAILY
Status: DISCONTINUED | OUTPATIENT
Start: 2023-12-08 | End: 2023-12-09 | Stop reason: HOSPADM

## 2023-12-08 RX ORDER — INSULIN LISPRO 100 [IU]/ML
12 INJECTION, SOLUTION INTRAVENOUS; SUBCUTANEOUS
Status: DISCONTINUED | OUTPATIENT
Start: 2023-12-09 | End: 2023-12-08

## 2023-12-08 RX ORDER — ACETAMINOPHEN 325 MG/1
650 TABLET ORAL EVERY 6 HOURS PRN
Status: DISCONTINUED | OUTPATIENT
Start: 2023-12-08 | End: 2023-12-09 | Stop reason: HOSPADM

## 2023-12-08 RX ORDER — FINASTERIDE 5 MG/1
5 TABLET, FILM COATED ORAL DAILY
Status: DISCONTINUED | OUTPATIENT
Start: 2023-12-09 | End: 2023-12-09 | Stop reason: HOSPADM

## 2023-12-08 RX ORDER — OXYBUTYNIN CHLORIDE 5 MG/1
5 TABLET, EXTENDED RELEASE ORAL DAILY
Status: DISCONTINUED | OUTPATIENT
Start: 2023-12-09 | End: 2023-12-09 | Stop reason: HOSPADM

## 2023-12-08 RX ORDER — INSULIN LISPRO 100 [IU]/ML
8 INJECTION, SOLUTION INTRAVENOUS; SUBCUTANEOUS
Status: DISCONTINUED | OUTPATIENT
Start: 2023-12-09 | End: 2023-12-09 | Stop reason: HOSPADM

## 2023-12-08 RX ORDER — ATORVASTATIN CALCIUM 20 MG/1
20 TABLET, FILM COATED ORAL DAILY
Status: DISCONTINUED | OUTPATIENT
Start: 2023-12-09 | End: 2023-12-09 | Stop reason: HOSPADM

## 2023-12-08 RX ORDER — AMLODIPINE BESYLATE 5 MG/1
5 TABLET ORAL DAILY
Status: DISCONTINUED | OUTPATIENT
Start: 2023-12-09 | End: 2023-12-09 | Stop reason: HOSPADM

## 2023-12-08 RX ORDER — INSULIN LISPRO 100 [IU]/ML
1-6 INJECTION, SOLUTION INTRAVENOUS; SUBCUTANEOUS
Status: DISCONTINUED | OUTPATIENT
Start: 2023-12-09 | End: 2023-12-09 | Stop reason: HOSPADM

## 2023-12-08 RX ORDER — MORPHINE SULFATE 4 MG/ML
4 INJECTION, SOLUTION INTRAMUSCULAR; INTRAVENOUS ONCE
Status: COMPLETED | OUTPATIENT
Start: 2023-12-08 | End: 2023-12-08

## 2023-12-08 RX ORDER — PANTOPRAZOLE SODIUM 40 MG/1
40 TABLET, DELAYED RELEASE ORAL DAILY
Status: DISCONTINUED | OUTPATIENT
Start: 2023-12-09 | End: 2023-12-09 | Stop reason: HOSPADM

## 2023-12-08 RX ADMIN — ERTAPENEM SODIUM 1000 MG: 1 INJECTION, POWDER, LYOPHILIZED, FOR SOLUTION INTRAMUSCULAR; INTRAVENOUS at 22:19

## 2023-12-08 RX ADMIN — MORPHINE SULFATE 4 MG: 4 INJECTION INTRAVENOUS at 18:20

## 2023-12-08 RX ADMIN — SODIUM CHLORIDE 75 ML/HR: 0.9 INJECTION, SOLUTION INTRAVENOUS at 22:15

## 2023-12-08 RX ADMIN — DOCUSATE SODIUM 100 MG: 100 CAPSULE, LIQUID FILLED ORAL at 22:10

## 2023-12-08 RX ADMIN — PIPERACILLIN AND TAZOBACTAM 3.38 G: 36; 4.5 INJECTION, POWDER, FOR SOLUTION INTRAVENOUS at 21:35

## 2023-12-08 RX ADMIN — GABAPENTIN 300 MG: 300 CAPSULE ORAL at 22:09

## 2023-12-08 RX ADMIN — CEFTRIAXONE 1000 MG: 1 INJECTION, SOLUTION INTRAVENOUS at 20:06

## 2023-12-08 RX ADMIN — LISINOPRIL 2.5 MG: 5 TABLET ORAL at 22:10

## 2023-12-08 RX ADMIN — IOHEXOL 100 ML: 350 INJECTION, SOLUTION INTRAVENOUS at 17:47

## 2023-12-08 RX ADMIN — INSULIN DETEMIR 30 UNITS: 100 INJECTION, SOLUTION SUBCUTANEOUS at 22:13

## 2023-12-08 NOTE — PROGRESS NOTES
12/8/2023      Chief Complaint   Patient presents with    Nephrolithiasis     Having severe pain. .right side          Assessment and Plan    Severe right flank/lower abdominal pain  - Urine dip today + leuks. Negative nitrites or blood. No UTI symptoms currently. Sent out for culture  - PVR=7 mL   - Offered stat abdominal imaging to r/o urologic pathology such as kidney stone which he ultimately defers and wishes to go to ED due to severity of pain. History of Present Illness  Eleni Haider is a 77 y.o. male here for close interval follow up after he scheduled himself for same day visit for back and side pain. He reports pain began 4 weeks ago but has progressively worsened to severe over the past week. Localizes to the right lower back and right lower abdomen. Pain increases to a 9/10 with any movement or standing. He reports pain is a 4/10 at rest/with sitting. No UTI symptoms, fevers, nausea or vomiting. He does endorse prior history of kidney stone. He was recently seen a couple weeks ago and is considering surgical options for the prostate. He is currently managed on alfuzosin, finasteride and detrol for lower urinary tract symptoms. He has had 3 positive urine cultures over the past year growing ESBL E Coli. CT C/A/P on 10/13/23 was negative for hydronephrosis. Review of Systems   Constitutional:  Negative for chills and fever. Respiratory:  Negative for shortness of breath. Cardiovascular:  Negative for chest pain. Gastrointestinal:  Positive for abdominal pain and constipation. Negative for blood in stool, diarrhea, nausea and vomiting. Genitourinary:  Positive for flank pain. Negative for difficulty urinating, dysuria, frequency, hematuria and urgency. Musculoskeletal:  Positive for back pain. Neurological:  Negative for dizziness.                   Past Medical History  Past Medical History:   Diagnosis Date    BPH (benign prostatic hypertrophy) 10 /2020    Chronic kidney disease 2020    Diabetes mellitus ([facility])     Foot ulcer due to secondary DM ([facility])     Hypertension     Urinary incontinence 2022    Urinary tract infection 2023       Past Social History  Past Surgical History:   Procedure Laterality Date    ESOPHAGOGASTRODUODENOSCOPY N/A 02/10/2017    Procedure: ESOPHAGOGASTRODUODENOSCOPY (EGD); Surgeon: Laura Rouse MD;  Location: [facility]; Service:     JOINT REPLACEMENT      LEG AMPUTATION THROUGH LOWER TIBIA AND FIBULA Left 2023    Procedure: AMPUTATION BELOW KNEE (BKA);   Surgeon: Brad Oneal DO;  Location: [facility];  Service: Vascular    ORCHIECTOMY  2022    they thought It was cancer but turned out not to be     Social History     Tobacco Use   Smoking Status Former    Packs/day: 1.50    Years: 40.00    Total pack years: 60.00    Types: Cigarettes, Pipe    Start date: 1969    Quit date: 2009    Years since quittin.9    Passive exposure: Past   Smokeless Tobacco Never   Tobacco Comments    already have       Past Family History  Family History   Problem Relation Age of Onset    Heart attack Father     Heart disease Father          of massive heart attack    Cancer Mother          stomach cancer    Diabetes Mother     No Known Problems Brother     No Known Problems Daughter        Past Social history  Social History     Socioeconomic History    Marital status: /Civil Union     Spouse name: Not on file    Number of children: Not on file    Years of education: Not on file    Highest education level: Not on file   Occupational History    Not on file   Tobacco Use    Smoking status: Former     Packs/day: 1.50     Years: 40.00     Total pack years: 60.00     Types: Cigarettes, Pipe     Start date: 1969     Quit date: 2009     Years since quittin.9     Passive exposure: Past    Smokeless tobacco: Never    Tobacco comments:     already have   Vaping Use    Vaping Use: Never used   Substance and Sexual Activity    Alcohol use: Not Currently    Drug use: Never    Sexual activity: Not Currently     Partners: Female     Birth control/protection: None   Other Topics Concern    Not on file   Social History Narrative    Not on file     Social Determinants of Health     Financial Resource Strain: Not on file   Food Insecurity: No Food Insecurity (4/4/2023)    Hunger Vital Sign     Worried About Running Out of Food in the Last Year: Never true     Ran Out of Food in the Last Year: Never true   Transportation Needs: No Transportation Needs (4/4/2023)    PRAPARE - Transportation     Lack of Transportation (Medical): No     Lack of Transportation (Non-Medical):  No   Physical Activity: Not on file   Stress: Not on file   Social Connections: Not on file   Intimate Partner Violence: Not on file   Housing Stability: Low Risk  (4/4/2023)    Housing Stability Vital Sign     Unable to Pay for Housing in the Last Year: No     Number of Places Lived in the Last Year: 1     Unstable Housing in the Last Year: No       Current Medications  Current Outpatient Medications   Medication Sig Dispense Refill    alfuzosin (UROXATRAL) 10 mg 24 hr tablet Take 1 tablet (10 mg total) by mouth daily 90 tablet 3    amLODIPine (NORVASC) 5 mg tablet Take 5 mg by mouth daily      atorvastatin (LIPITOR) 20 mg tablet Take 20 mg by mouth daily      benzonatate (TESSALON PERLES) 100 mg capsule Take 100 mg by mouth 3 (three) times a day as needed for cough      clopidogrel (PLAVIX) 75 mg tablet Take 75 mg by mouth daily      Diclofenac Sodium (VOLTAREN) 1 % Apply 2 g topically 4 (four) times a day 2 g 0    finasteride (PROSCAR) 5 mg tablet Take 1 tablet (5 mg total) by mouth daily 90 tablet 3    gabapentin (NEURONTIN) 300 mg capsule Take 300 mg by mouth 3 (three) times a day      Glucagon (Gvoke HypoPen 2-Pack) 1 MG/0.2ML SOAJ Inject 1 mg under the skin      HumaLOG KwikPen 100 units/mL injection pen       insulin degludec Alonza Morrissey FlexTouch) 200 units/mL CONCENTRATED U-200 injection pen Inject 36 Units under the skin daily at bedtime      insulin NPH (HumuLIN N,NovoLIN N) 100 Units/mL subcutaneous injection Inject 8 Units under the skin 2 (two) times a day before meals      lisinopril (ZESTRIL) 2.5 mg tablet every 24 hours      metFORMIN (GLUCOPHAGE) 500 mg tablet Take 1,000 mg by mouth 2 (two) times a day with meals      tolterodine (DETROL LA) 2 mg 24 hr capsule Take 1 capsule (2 mg total) by mouth daily 90 capsule 3    albuterol (PROVENTIL HFA,VENTOLIN HFA) 90 mcg/act inhaler Inhale 2 puffs every 4 (four) hours as needed for wheezing or shortness of breath (coughing spells) (Patient not taking: Reported on 12/8/2023) 1 Inhaler 0    cephalexin (KEFLEX) 500 mg capsule Take 500 mg by mouth 2 (two) times a day (Patient not taking: Reported on 12/8/2023)      diazepam (VALIUM) 5 mg tablet  (Patient not taking: Reported on 12/8/2023)      lidocaine (LIDODERM) 5 % Apply 1 patch topically over 12 hours daily for 10 days Remove & Discard patch within 12 hours or as directed by MD 10 patch 0    oxyCODONE (ROXICODONE) 5 immediate release tablet Take 1 tablet (5 mg total) by mouth every 4 (four) hours as needed for moderate pain for up to 10 doses Max Daily Amount: 30 mg (Patient not taking: Reported on 12/8/2023) 10 tablet 0    pantoprazole (PROTONIX) 40 mg tablet Take 1 tablet by mouth daily for 30 days 30 tablet 2     No current facility-administered medications for this visit.        Allergies  Allergies   Allergen Reactions    Liraglutide GI Intolerance     Severe acid reflux    Iodinated Contrast Media Other (See Comments)     "kidney problems"    Lantus [Insulin Glargine] Hives    Sulfa Antibiotics Rash         The following portions of the patient's history were reviewed and updated as appropriate: allergies, current medications, past medical history, past social history, past surgical history and problem list.      Vitals  Vitals:    12/08/23 1452   BP: 140/80   BP Location: Left arm Patient Position: Sitting   Pulse: 74   SpO2: 94%   Weight: 106 kg (233 lb)   Height: 6' 3" (1.905 m)           Physical Exam  Physical Exam  Constitutional:       General: He is not in acute distress. Appearance: Normal appearance. He is not ill-appearing, toxic-appearing or diaphoretic. HENT:      Head: Normocephalic and atraumatic. Right Ear: External ear normal.      Left Ear: External ear normal.      Nose: Nose normal.   Eyes:      General: No scleral icterus. Conjunctiva/sclera: Conjunctivae normal.   Cardiovascular:      Rate and Rhythm: Normal rate. Pulses: Normal pulses. Pulmonary:      Effort: Pulmonary effort is normal.   Abdominal:      General: Abdomen is flat. There is no distension. Palpations: Abdomen is soft. Tenderness: There is abdominal tenderness. There is right CVA tenderness (RLQ). There is no left CVA tenderness, guarding or rebound. Hernia: No hernia is present. Musculoskeletal:      Cervical back: Normal range of motion. Comments: Unable to stand up straight without severe pain   Skin:     General: Skin is warm and dry. Neurological:      General: No focal deficit present. Mental Status: He is alert and oriented to person, place, and time. Psychiatric:         Mood and Affect: Mood normal.         Behavior: Behavior normal.         Thought Content:  Thought content normal.         Judgment: Judgment normal.           Results  Recent Results (from the past 1 hour(s))   POCT urine dip    Collection Time: 12/08/23  2:54 PM   Result Value Ref Range    LEUKOCYTE ESTERASE,++     NITRITE,UA -     SL AMB POCT UROBILINOGEN 3.5     POCT URINE PROTEIN 0.3      PH,UA 5.0     BLOOD,UA -     SPECIFIC GRAVITY,UA 1.025     KETONES,UA 0.5     BILIRUBIN,UA 17     GLUCOSE, UA 15      COLOR,UA yellow     CLARITY,UA cloudy    POCT Measure PVR    Collection Time: 12/08/23  2:55 PM   Result Value Ref Range    POST-VOID RESIDUAL VOLUME, ML POC 7 mL ]  Lab Results   Component Value Date    PSA 1.17 11/06/2023     Lab Results   Component Value Date    CALCIUM 8.4 10/14/2023    K 4.4 10/14/2023    CO2 25 10/14/2023     10/14/2023    BUN 20 10/14/2023    CREATININE 1.11 10/14/2023     Lab Results   Component Value Date    WBC 7.68 10/14/2023    HGB 9.0 (L) 10/14/2023    HCT 31.2 (L) 10/14/2023    MCV 77 (L) 10/14/2023     10/14/2023           Orders  Orders Placed This Encounter   Procedures    POCT urine dip    POCT Measure PVR       Rawland Low

## 2023-12-08 NOTE — ED PROVIDER NOTES
History  Chief Complaint   Patient presents with   • Back Pain     Pt with back pain x4 weeks, no relief with tylenol at home. Sent over by urology for eval     Patient is a 7year-old male past medical history diabetes, peripheral vascular disease, CKD stage III, BPH, GERD, UTI with multidrug-resistant organism presenting for back pain. Patient notes 1 month of constant worsening right flank pain which radiates into the right upper quadrant it is worse with movement and urination. Notes urinary frequency but denies dysuria or hematuria. Denies any nausea/vomiting/diarrhea/constipation and denies chest pain or trouble breathing but states it is worse with deep breathing. Denies any recent falls or injuries other than 4 weeks ago when he was seen here and broke 4 ribs on the left side. Denies any rashes, vision changes, fevers. Has been taking Tylenol with no relief. Was sent in by urology. Prior to Admission Medications   Prescriptions Last Dose Informant Patient Reported? Taking?    Diclofenac Sodium (VOLTAREN) 1 %  Self No No   Sig: Apply 2 g topically 4 (four) times a day   Glucagon (Gvoke HypoPen 2-Pack) 1 MG/0.2ML SOAJ  Self Yes No   Sig: Inject 1 mg under the skin   HumaLOG KwikPen 100 units/mL injection pen  Self Yes No   albuterol (PROVENTIL HFA,VENTOLIN HFA) 90 mcg/act inhaler  Self No No   Sig: Inhale 2 puffs every 4 (four) hours as needed for wheezing or shortness of breath (coughing spells)   Patient not taking: Reported on 12/8/2023   alfuzosin (UROXATRAL) 10 mg 24 hr tablet   No No   Sig: Take 1 tablet (10 mg total) by mouth daily   amLODIPine (NORVASC) 5 mg tablet  Self Yes No   Sig: Take 5 mg by mouth daily   atorvastatin (LIPITOR) 20 mg tablet  Self Yes No   Sig: Take 20 mg by mouth daily   benzonatate (TESSALON PERLES) 100 mg capsule  Self Yes No   Sig: Take 100 mg by mouth 3 (three) times a day as needed for cough   cephalexin (KEFLEX) 500 mg capsule  Self Yes No   Sig: Take 500 mg by mouth 2 (two) times a day   Patient not taking: Reported on 12/8/2023   clopidogrel (PLAVIX) 75 mg tablet  Self Yes No   Sig: Take 75 mg by mouth daily   diazepam (VALIUM) 5 mg tablet   Yes No   Patient not taking: Reported on 12/8/2023   finasteride (PROSCAR) 5 mg tablet   No No   Sig: Take 1 tablet (5 mg total) by mouth daily   gabapentin (NEURONTIN) 300 mg capsule  Self Yes No   Sig: Take 300 mg by mouth 3 (three) times a day   insulin NPH (HumuLIN N,NovoLIN N) 100 Units/mL subcutaneous injection  Self No No   Sig: Inject 8 Units under the skin 2 (two) times a day before meals   insulin degludec Latrelle Inch FlexTouch) 200 units/mL CONCENTRATED U-200 injection pen  Self No No   Sig: Inject 36 Units under the skin daily at bedtime   lidocaine (LIDODERM) 5 %   No No   Sig: Apply 1 patch topically over 12 hours daily for 10 days Remove & Discard patch within 12 hours or as directed by MD   lisinopril (ZESTRIL) 2.5 mg tablet  Self Yes No   Sig: every 24 hours   metFORMIN (GLUCOPHAGE) 500 mg tablet  Self Yes No   Sig: Take 1,000 mg by mouth 2 (two) times a day with meals   oxyCODONE (ROXICODONE) 5 immediate release tablet  Self No No   Sig: Take 1 tablet (5 mg total) by mouth every 4 (four) hours as needed for moderate pain for up to 10 doses Max Daily Amount: 30 mg   Patient not taking: Reported on 12/8/2023   pantoprazole (PROTONIX) 40 mg tablet  Self No No   Sig: Take 1 tablet by mouth daily for 30 days   tolterodine (DETROL LA) 2 mg 24 hr capsule   No No   Sig: Take 1 capsule (2 mg total) by mouth daily      Facility-Administered Medications: None       Past Medical History:   Diagnosis Date   • BPH (benign prostatic hypertrophy) 10 /2020   • Chronic kidney disease 8/2020   • Diabetes mellitus ([address])    • Foot ulcer due to secondary DM ([address])    • Hypertension 4 /2021   • Urinary incontinence 8/2022   • Urinary tract infection 4/2023       Past Surgical History:   Procedure Laterality Date   • ESOPHAGOGASTRODUODENOSCOPY N/A 02/10/2017    Procedure: ESOPHAGOGASTRODUODENOSCOPY (EGD); Surgeon: Haylee Lima MD;  Location: [facility]; Service:    • JOINT REPLACEMENT     • LEG AMPUTATION THROUGH LOWER TIBIA AND FIBULA Left 2023    Procedure: AMPUTATION BELOW KNEE (BKA); Surgeon: Shelly Goldberg DO;  Location: [facility];  Service: Vascular   • ORCHIECTOMY  2022    they thought It was cancer but turned out not to be       Family History   Problem Relation Age of Onset   • Heart attack Father    • Heart disease Father          of massive heart attack   • Cancer Mother          stomach cancer   • Diabetes Mother    • No Known Problems Brother    • No Known Problems Daughter      I have reviewed and agree with the history as documented. E-Cigarette/Vaping   • E-Cigarette Use Never User      E-Cigarette/Vaping Substances   • Nicotine No    • THC No    • CBD No    • Flavoring No    • Other No    • Unknown No      Social History     Tobacco Use   • Smoking status: Former     Packs/day: 1.50     Years: 40.00     Total pack years: 60.00     Types: Cigarettes, Pipe     Start date: 1969     Quit date: 2009     Years since quittin.9     Passive exposure: Past   • Smokeless tobacco: Never   • Tobacco comments:     already have   Vaping Use   • Vaping Use: Never used   Substance Use Topics   • Alcohol use: Not Currently   • Drug use: Never       Review of Systems   All other systems reviewed and are negative. Physical Exam  Physical Exam  Vitals reviewed. Constitutional:       General: He is not in acute distress. Appearance: Normal appearance. He is not ill-appearing. HENT:      Mouth/Throat:      Mouth: Mucous membranes are moist.   Eyes:      Conjunctiva/sclera: Conjunctivae normal.   Cardiovascular:      Rate and Rhythm: Normal rate and regular rhythm. Pulses: Normal pulses. Heart sounds: Normal heart sounds.    Pulmonary:      Effort: Pulmonary effort is normal.      Breath sounds: Normal breath sounds. Abdominal:      General: Abdomen is flat. Palpations: Abdomen is soft. Tenderness: There is abdominal tenderness. There is right CVA tenderness. There is no guarding. Comments: Right upper quadrant tenderness without guarding   Musculoskeletal:         General: No swelling. Normal range of motion. Cervical back: Neck supple. Right lower leg: No edema. Left lower leg: No edema. Comments: No spinal tenderness   Skin:     General: Skin is warm and dry. Neurological:      General: No focal deficit present. Mental Status: He is alert.    Psychiatric:         Mood and Affect: Mood normal.       Vital Signs  ED Triage Vitals [12/08/23 1557]   Temperature Pulse Respirations Blood Pressure SpO2   98.7 °F (37.1 °C) 80 14 138/65 97 %      Temp Source Heart Rate Source Patient Position - Orthostatic VS BP Location FiO2 (%)   Temporal Monitor Sitting Left arm --      Pain Score       --           Vitals:    12/08/23 1557   BP: 138/65   Pulse: 80   Patient Position - Orthostatic VS: Sitting         Visual Acuity      ED Medications  Medications - No data to display    Diagnostic Studies  Results Reviewed       Procedure Component Value Units Date/Time    HS Troponin 0hr (reflex protocol) [[id number]]     Lab Status: No result Specimen: Blood     D-Dimer [[id number]]     Lab Status: No result Specimen: Blood     Comprehensive metabolic panel [[id number]] Collected: 12/08/23 1648    Lab Status: No result Specimen: Blood from Arm, Right     CBC and differential [[id number]] Collected: 12/08/23 1648    Lab Status: No result Specimen: Blood from Arm, Right     UA w Reflex to Microscopic w Reflex to Culture [[id number]]     Lab Status: No result Specimen: Urine                    CT abdomen pelvis with contrast    (Results Pending)   XR chest 1 view portable    (Results Pending)              Procedures  ECG 12 Lead Documentation Only    Date/Time: 12/8/2023 5:09 PM    Performed by: Navjot Maurice DO  Authorized by: Navjot Maurice DO    Patient location:  ED  Previous ECG:     Previous ECG:  Compared to current    Similarity:  No change  Interpretation:     Interpretation: normal    Rate:     ECG rate assessment: normal    Rhythm:     Rhythm: sinus rhythm    Ectopy:     Ectopy: none    QRS:     QRS axis:  Normal    QRS intervals:  Normal  Conduction:     Conduction: normal    ST segments:     ST segments:  Normal  T waves:     T waves: normal             ED Course  ED Course as of 12/08/23 2229   Fri Dec 08, 2023   2108 Patient with cystitis on CT, given history of multidrug-resistant UTI have given ceftriaxone however on review of patient's prior cultures does seem susceptible to Zosyn which I will give and admit. SBIRT 20yo+      Flowsheet Row Most Recent Value   Initial Alcohol Screen: US AUDIT-C     1. How often do you have a drink containing alcohol? 0 Filed at: 12/08/2023 1640   2. How many drinks containing alcohol do you have on a typical day you are drinking? 0 Filed at: 12/08/2023 1640   3a. Male UNDER 65: How often do you have five or more drinks on one occasion? 0 Filed at: 12/08/2023 1640   3b. FEMALE Any Age, or MALE 65+: How often do you have 4 or more drinks on one occassion? 0 Filed at: 12/08/2023 1640   Audit-C Score 0 Filed at: 12/08/2023 1640   DERRICK: How many times in the past year have you. .. Used an illegal drug or used a prescription medication for non-medical reasons? Never Filed at: 12/08/2023 1640                      Medical Decision Making  Patient 66-year-old male past medical Struve diabetes, GERD, peripheral vascular disease, CKD stage III, UTI, BPH presenting with flank pain. Patient is well-appearing at bedside with stable vitals and in no acute distress.   He has right CVA tenderness with right upper quadrant tenderness without guarding, equal pulses, no lower extreme edema no other significant physical exam findings. As he denies any risk factors for pulmonary embolism but does note pain worse with breathing will obtain D-dimer to rule out PE, urinalysis to assess for signs of infection or stone, imaging to assess for stones, small bowel obstruction, diverticulitis, pyelonephritis, cholecystitis, if positive D-dimer CT PE, give pain control and reassess. Amount and/or Complexity of Data Reviewed  Labs: ordered. Radiology: ordered. Risk  Prescription drug management. Disposition  Final diagnoses:   None     ED Disposition       None          Follow-up Information    None         Patient's Medications   Discharge Prescriptions    No medications on file       No discharge procedures on file.     PDMP Review       None            ED Provider  Electronically Signed by             Deniz Rudolph DO  12/08/23 8151

## 2023-12-09 VITALS
BODY MASS INDEX: 29.06 KG/M2 | WEIGHT: 233.69 LBS | HEART RATE: 69 BPM | SYSTOLIC BLOOD PRESSURE: 123 MMHG | OXYGEN SATURATION: 96 % | DIASTOLIC BLOOD PRESSURE: 59 MMHG | TEMPERATURE: 98.1 F | HEIGHT: 75 IN | RESPIRATION RATE: 18 BRPM

## 2023-12-09 PROBLEM — R10.9 FLANK PAIN: Status: ACTIVE | Noted: 2023-12-08

## 2023-12-09 LAB
ANION GAP SERPL CALCULATED.3IONS-SCNC: 8 MMOL/L
ATRIAL RATE: 74 BPM
BACTERIA UR CULT: NORMAL
BUN SERPL-MCNC: 27 MG/DL (ref 5–25)
CALCIUM SERPL-MCNC: 8.5 MG/DL (ref 8.4–10.2)
CHLORIDE SERPL-SCNC: 106 MMOL/L (ref 96–108)
CO2 SERPL-SCNC: 24 MMOL/L (ref 21–32)
CREAT SERPL-MCNC: 1.07 MG/DL (ref 0.6–1.3)
ERYTHROCYTE [DISTWIDTH] IN BLOOD BY AUTOMATED COUNT: 17.4 % (ref 11.6–15.1)
GFR SERPL CREATININE-BSD FRML MDRD: 69 ML/MIN/1.73SQ M
GLUCOSE SERPL-MCNC: 119 MG/DL (ref 65–140)
GLUCOSE SERPL-MCNC: 153 MG/DL (ref 65–140)
GLUCOSE SERPL-MCNC: 228 MG/DL (ref 65–140)
GLUCOSE SERPL-MCNC: 234 MG/DL (ref 65–140)
HCT VFR BLD AUTO: 29.8 % (ref 36.5–49.3)
HGB BLD-MCNC: 8.9 G/DL (ref 12–17)
MCH RBC QN AUTO: 23.4 PG (ref 26.8–34.3)
MCHC RBC AUTO-ENTMCNC: 29.9 G/DL (ref 31.4–37.4)
MCV RBC AUTO: 78 FL (ref 82–98)
P AXIS: 21 DEGREES
PLATELET # BLD AUTO: 243 THOUSANDS/UL (ref 149–390)
PMV BLD AUTO: 10.5 FL (ref 8.9–12.7)
POTASSIUM SERPL-SCNC: 4 MMOL/L (ref 3.5–5.3)
PR INTERVAL: 176 MS
QRS AXIS: 2 DEGREES
QRSD INTERVAL: 72 MS
QT INTERVAL: 354 MS
QTC INTERVAL: 392 MS
RBC # BLD AUTO: 3.81 MILLION/UL (ref 3.88–5.62)
SODIUM SERPL-SCNC: 138 MMOL/L (ref 135–147)
T WAVE AXIS: 34 DEGREES
VENTRICULAR RATE: 74 BPM
WBC # BLD AUTO: 5.72 THOUSAND/UL (ref 4.31–10.16)

## 2023-12-09 PROCEDURE — 99239 HOSP IP/OBS DSCHRG MGMT >30: CPT | Performed by: PHYSICIAN ASSISTANT

## 2023-12-09 PROCEDURE — 80048 BASIC METABOLIC PNL TOTAL CA: CPT

## 2023-12-09 PROCEDURE — 82948 REAGENT STRIP/BLOOD GLUCOSE: CPT

## 2023-12-09 PROCEDURE — 36415 COLL VENOUS BLD VENIPUNCTURE: CPT

## 2023-12-09 PROCEDURE — NC001 PR NO CHARGE: Performed by: PHYSICIAN ASSISTANT

## 2023-12-09 PROCEDURE — 85027 COMPLETE CBC AUTOMATED: CPT

## 2023-12-09 RX ORDER — METHOCARBAMOL 500 MG/1
500 TABLET, FILM COATED ORAL EVERY 8 HOURS PRN
Qty: 12 TABLET | Refills: 0 | Status: SHIPPED | OUTPATIENT
Start: 2023-12-09

## 2023-12-09 RX ORDER — MUSCLE RUB CREAM 100; 150 MG/G; MG/G
CREAM TOPICAL 4 TIMES DAILY PRN
Status: DISCONTINUED | OUTPATIENT
Start: 2023-12-09 | End: 2023-12-09 | Stop reason: HOSPADM

## 2023-12-09 RX ORDER — METHOCARBAMOL 500 MG/1
500 TABLET, FILM COATED ORAL EVERY 6 HOURS PRN
Status: DISCONTINUED | OUTPATIENT
Start: 2023-12-09 | End: 2023-12-09 | Stop reason: HOSPADM

## 2023-12-09 RX ORDER — ACETAMINOPHEN 325 MG/1
975 TABLET ORAL EVERY 8 HOURS SCHEDULED
Status: DISCONTINUED | OUTPATIENT
Start: 2023-12-09 | End: 2023-12-09 | Stop reason: HOSPADM

## 2023-12-09 RX ORDER — LIDOCAINE 50 MG/G
1 PATCH TOPICAL DAILY
Status: DISCONTINUED | OUTPATIENT
Start: 2023-12-09 | End: 2023-12-09 | Stop reason: HOSPADM

## 2023-12-09 RX ADMIN — ATORVASTATIN CALCIUM 20 MG: 20 TABLET, FILM COATED ORAL at 09:04

## 2023-12-09 RX ADMIN — FINASTERIDE 5 MG: 5 TABLET, FILM COATED ORAL at 09:09

## 2023-12-09 RX ADMIN — OXYBUTYNIN CHLORIDE 5 MG: 5 TABLET, EXTENDED RELEASE ORAL at 09:09

## 2023-12-09 RX ADMIN — GABAPENTIN 300 MG: 300 CAPSULE ORAL at 09:04

## 2023-12-09 RX ADMIN — METHOCARBAMOL TABLETS 500 MG: 500 TABLET, COATED ORAL at 11:31

## 2023-12-09 RX ADMIN — HEPARIN SODIUM 5000 UNITS: 5000 INJECTION INTRAVENOUS; SUBCUTANEOUS at 14:21

## 2023-12-09 RX ADMIN — DOCUSATE SODIUM 100 MG: 100 CAPSULE, LIQUID FILLED ORAL at 09:04

## 2023-12-09 RX ADMIN — AMLODIPINE BESYLATE 5 MG: 5 TABLET ORAL at 09:04

## 2023-12-09 RX ADMIN — PANTOPRAZOLE SODIUM 40 MG: 40 TABLET, DELAYED RELEASE ORAL at 09:04

## 2023-12-09 RX ADMIN — GABAPENTIN 300 MG: 300 CAPSULE ORAL at 15:32

## 2023-12-09 RX ADMIN — INSULIN LISPRO 2 UNITS: 100 INJECTION, SOLUTION INTRAVENOUS; SUBCUTANEOUS at 17:00

## 2023-12-09 RX ADMIN — ACETAMINOPHEN 650 MG: 325 TABLET, FILM COATED ORAL at 14:20

## 2023-12-09 RX ADMIN — LIDOCAINE 1 PATCH: 50 PATCH CUTANEOUS at 11:31

## 2023-12-09 RX ADMIN — TAMSULOSIN HYDROCHLORIDE 0.4 MG: 0.4 CAPSULE ORAL at 15:32

## 2023-12-09 RX ADMIN — INSULIN LISPRO 8 UNITS: 100 INJECTION, SOLUTION INTRAVENOUS; SUBCUTANEOUS at 17:00

## 2023-12-09 RX ADMIN — CLOPIDOGREL 75 MG: 75 TABLET ORAL at 09:04

## 2023-12-09 RX ADMIN — INSULIN LISPRO 8 UNITS: 100 INJECTION, SOLUTION INTRAVENOUS; SUBCUTANEOUS at 12:39

## 2023-12-09 NOTE — ASSESSMENT & PLAN NOTE
Blood Sugar Average Last 72 hrs:(P) 176.5  Controlled at home on insulin, A1c 6.7   Continue home regimen on discharge

## 2023-12-09 NOTE — ASSESSMENT & PLAN NOTE
Patient sent to [facility] ED from Urology office for c/o of severe flank pain. In office, POC Urine dip +leukocytes. Hx of recurrent UTI. Patient denies fever, chills, urgency, frequency, n/v/d.   CT Scan: Mild diffuse wall thickening of the urinary bladder.    Receive Zosyn, Ceftriaxone, and ertapenem - discontinue all and monitor off antibiotics as he is asymptomatic  Remains afebrile, without leukocytosis, VSS  Pain in clearly reproducible, MSK origin suspected  Start scheduled tylenol, lidoderm, topical heat, bengay if desired, and trial robaxin  Counseled on risks/benefits of muscle relaxers

## 2023-12-09 NOTE — H&P
[address]  H&P  Name: Nathen Wilkinson 77 y.o. male I MRN: [id number]  Unit/Bed#: ED 24 I Date of Admission: 12/8/2023   Date of Service: 12/8/2023 I Hospital Day: 0      Assessment/Plan   * Acute cystitis without hematuria  Assessment & Plan  Patient sent to [facility] ED from Urology office for c/o of severe flank pain. In office, POC Urine dip +leukocytes. Hx of recurrent UTI. Patient denies fever, chills, urgency, frequency, n/v/d.   CT Scan: Mild diffuse wall thickening of the urinary bladder. Clinical correlation for cystitis   IV Rocephin and Zosyn given in the ED. Will start Clarise Ar as patient has hx of ESBL  ID consult, recommendations appreciated  IV hydration  Supportive therapy    Type 2 diabetes mellitus with diabetic nephropathy, with long-term current use of insulin ([address])  Assessment & Plan  Lab Results   Component Value Date    HGBA1C 6.7 (H) 07/17/2023       No results for input(s): "POCGLU" in the last 72 hours. Blood Sugar Average: Last 72 hrs:  blood sugars well controlled at home on insulin  SSI coverage, adjust as needed for goal 140-180  Levemir 30u at night, humalog 8u with meals  Hypoglycemia protocol  POC glucose checks AC/HS      Primary hypertension  Assessment & Plan  Bp acceptable  Continue home dose amlodipine, lisinopril    Stage 3 chronic kidney disease, unspecified whether stage 3a or 3b CKD [facility])  Assessment & Plan  Lab Results   Component Value Date    EGFR 62 12/08/2023    EGFR 67 10/14/2023    EGFR 61 10/13/2023    CREATININE 1.18 12/08/2023    CREATININE 1.11 10/14/2023    CREATININE 1.19 10/13/2023     Cr. 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  Sequential Compression Devices Ordered. Code Status: Level 1 - Full Code   Discussion with family: Patient declined call to . Anticipated Length of Stay: Patient will be admitted on an inpatient basis with an anticipated length of stay of greater than 2 midnights secondary to need for IV antibiotics. Total Time Spent on Date of Encounter in care of patient: 75 mins. This time was spent on one or more of the following: performing physical exam; counseling and coordination of care; obtaining or reviewing history; documenting in the medical record; reviewing/ordering tests, medications or procedures; communicating with other healthcare professionals and discussing with patient's family/caregivers. Chief Complaint:    Chief Complaint   Patient presents with    Back Pain     Pt with back pain x4 weeks, no relief with tylenol at home. Sent over by urology for eval         History of Present Illness:  Yung Sno is a 77 y.o. male with a PMH of T2DM, BPH, PVD and HTN who presents with back pain. Patient seen at outpatient urology office today and reported severe back pain which has lasted about 4 weeks. In office Urine dip +leukocytes. Patient advised to report to ED. Upon arrival to ED, CT shows acute cystitis. He has had multiple MDRO UTI in the past. Patient denies fever, chills, frequency, urgency or n/v/d. Will admit for IV antibiotics. Discussed plan with patient. All questions answered to patient's satisfatication. Review of Systems:  Review of Systems   HENT: Negative. Cardiovascular:  Negative for chest pain. Gastrointestinal: Negative. Genitourinary:  Positive for flank pain. Neurological: Negative. Hematological: Negative. Psychiatric/Behavioral: Negative.          Past Medical and Surgical History:   Past Medical History:   Diagnosis Date    BPH (benign prostatic hypertrophy) 10 /2020    Chronic kidney disease 8/2020    Diabetes mellitus ([address])     Foot ulcer due to secondary DM [facility])     Hypertension 4 /2021    Urinary incontinence 8/2022    Urinary tract infection 4/2023       Past Surgical History:   Procedure Laterality Date    ESOPHAGOGASTRODUODENOSCOPY N/A 02/10/2017    Procedure: ESOPHAGOGASTRODUODENOSCOPY (EGD); Surgeon: Kimberly Sullivan MD;  Location: [facility]; Service:     JOINT REPLACEMENT      LEG AMPUTATION THROUGH LOWER TIBIA AND FIBULA Left 04/06/2023    Procedure: AMPUTATION BELOW KNEE (BKA); Surgeon: Roddy Self DO;  Location: [facility];  Service: Vascular    ORCHIECTOMY  5/2022    they thought It was cancer but turned out not to be       Meds/Allergies:  Prior to Admission medications    Medication Sig Start Date End Date Taking?  Authorizing Provider   albuterol (PROVENTIL HFA,VENTOLIN HFA) 90 mcg/act inhaler Inhale 2 puffs every 4 (four) hours as needed for wheezing or shortness of breath (coughing spells)  Patient not taking: Reported on 12/8/2023 2/5/17   Maryse Adam MD   alfuzosin (UROXATRAL) 10 mg 24 hr tablet Take 1 tablet (10 mg total) by mouth daily 11/17/23   Missy Cobian PA-C   amLODIPine (NORVASC) 5 mg tablet Take 5 mg by mouth daily    Historical Provider, MD   atorvastatin (LIPITOR) 20 mg tablet Take 20 mg by mouth daily    Historical Provider, MD   benzonatate (TESSALON PERLES) 100 mg capsule Take 100 mg by mouth 3 (three) times a day as needed for cough    Historical Provider, MD   cephalexin (KEFLEX) 500 mg capsule Take 500 mg by mouth 2 (two) times a day  Patient not taking: Reported on 12/8/2023 10/5/23   Historical Provider, MD   clopidogrel (PLAVIX) 75 mg tablet Take 75 mg by mouth daily    Historical Provider, MD   diazepam (VALIUM) 5 mg tablet  11/24/23   Historical Provider, MD   Diclofenac Sodium (VOLTAREN) 1 % Apply 2 g topically 4 (four) times a day 10/15/23   Rohit Hooper MD   finasteride (PROSCAR) 5 mg tablet Take 1 tablet (5 mg total) by mouth daily 11/17/23   Arleth Tello PA-C   gabapentin (NEURONTIN) 300 mg capsule Take 300 mg by mouth 3 (three) times a day    Historical Provider, MD   Glucagon (Gvoke HypoPen 2-Pack) 1 MG/0.2ML SOAJ Inject 1 mg under the skin 7/5/23   Historical Provider, MD   HumaLOG KwikPen 100 units/mL injection pen  9/27/23   Historical Provider, MD   insulin degludec Govind Philipark FlexTouch) 200 units/mL CONCENTRATED U-200 injection pen Inject 36 Units under the skin daily at bedtime 10/15/23   Crystal Soria MD   insulin NPH (HumuLIN N,NovoLIN N) 100 Units/mL subcutaneous injection Inject 8 Units under the skin 2 (two) times a day before meals 10/15/23   Crystal Soria MD   lidocaine (LIDODERM) 5 % Apply 1 patch topically over 12 hours daily for 10 days Remove & Discard patch within 12 hours or as directed by MD 10/15/23 10/25/23  Crystal Soria MD   lisinopril (ZESTRIL) 2.5 mg tablet every 24 hours    Historical Provider, MD   metFORMIN (GLUCOPHAGE) 500 mg tablet Take 1,000 mg by mouth 2 (two) times a day with meals    Historical Provider, MD   oxyCODONE (ROXICODONE) 5 immediate release tablet Take 1 tablet (5 mg total) by mouth every 4 (four) hours as needed for moderate pain for up to 10 doses Max Daily Amount: 30 mg  Patient not taking: Reported on 12/8/2023 10/15/23   Crystal Soria MD   pantoprazole (PROTONIX) 40 mg tablet Take 1 tablet by mouth daily for 30 days 2/15/17 9/8/23  Dyan Cobos MD   tolterodine (DETROL LA) 2 mg 24 hr capsule Take 1 capsule (2 mg total) by mouth daily 11/17/23   Mark Hendrickson PA-C     I have reviewed home medications with patient personally. Allergies:    Allergies   Allergen Reactions    Liraglutide GI Intolerance     Severe acid reflux    Iodinated Contrast Media Other (See Comments)     "kidney problems"    Lantus [Insulin Glargine] Hives    Sulfa Antibiotics Rash       Social History:  Marital Status: /Civil Union   Occupation:   Patient Pre-hospital Living Situation: Home, With spouse  Patient Pre-hospital Level of Mobility: walks  Patient Pre-hospital Diet Restrictions:   Substance Use History:   Social History     Substance and Sexual Activity   Alcohol Use Not Currently     Social History     Tobacco Use   Smoking Status Former    Packs/day: 1.50    Years: 40.00    Total pack years: 60.00    Types: Cigarettes, Pipe    Start date: 1969    Quit date: 2009    Years since quittin.9    Passive exposure: Past   Smokeless Tobacco Never   Tobacco Comments    already have     Social History     Substance and Sexual Activity   Drug Use Never       Family History:  Family History   Problem Relation Age of Onset    Heart attack Father     Heart disease Father          of massive heart attack    Cancer Mother          stomach cancer    Diabetes Mother     No Known Problems Brother     No Known Problems Daughter        Physical Exam:     Vitals:   Blood Pressure: 138/63 (23 2200)  Pulse: 67 (230)  Temperature: 98 °F (36.7 °C) (23)  Temp Source: Oral (23)  Respirations: 13 (23)  SpO2: 98 % (23)    Physical Exam  Vitals and nursing note reviewed. Constitutional:       General: He is not in acute distress. Appearance: He is well-developed. HENT:      Head: Normocephalic and atraumatic. Eyes:      Conjunctiva/sclera: Conjunctivae normal.   Cardiovascular:      Rate and Rhythm: Normal rate and regular rhythm. Pulses: Normal pulses. Heart sounds: Normal heart sounds. No murmur heard. Pulmonary:      Effort: Pulmonary effort is normal. No respiratory distress. Breath sounds: Normal breath sounds. Abdominal:      Palpations: Abdomen is soft. Musculoskeletal:         General: No swelling. Cervical back: Neck supple. Right lower leg: Edema present. Comments: Left BKA with Prosthesis   Skin:     General: Skin is warm and dry. Capillary Refill: Capillary refill takes less than 2 seconds. Neurological:      General: No focal deficit present.       Mental Status: He is alert and oriented to person, place, and time. Psychiatric:         Mood and Affect: Mood normal.          Additional Data:     Lab Results:  Results from last 7 days   Lab Units 12/08/23  2204 12/08/23  1648   WBC Thousand/uL  --  7.02   HEMOGLOBIN g/dL  --  9.2*   HEMATOCRIT %  --  30.7*   PLATELETS Thousands/uL 272 276   NEUTROS PCT %  --  69   LYMPHS PCT %  --  19   MONOS PCT %  --  9   EOS PCT %  --  2     Results from last 7 days   Lab Units 12/08/23  1648   SODIUM mmol/L 138   POTASSIUM mmol/L 4.6   CHLORIDE mmol/L 107   CO2 mmol/L 26   BUN mg/dL 29*   CREATININE mg/dL 1.18   ANION GAP mmol/L 5   CALCIUM mg/dL 9.0   ALBUMIN g/dL 4.0   TOTAL BILIRUBIN mg/dL 0.29   ALK PHOS U/L 59   ALT U/L 8   AST U/L 9*   GLUCOSE RANDOM mg/dL 182*                       Lines/Drains:  Invasive Devices       Peripheral Intravenous Line  Duration             Peripheral IV 12/08/23 Right Antecubital <1 day                        Imaging: Reviewed radiology reports from this admission including: abdominal/pelvic CT  CT abdomen pelvis with contrast   Final Result by Dom Arriaga MD (12/08 1900)      Mild diffuse wall thickening of the urinary bladder. Clinical correlation for cystitis. Workstation performed: [id number]         XR chest 1 view portable   ED Interpretation by Navjot Maurice DO (12/08 1752)   NAD          EKG and Other Studies Reviewed on Admission:   EKG: NSR. HR 74.    ** Please Note: This note has been constructed using a voice recognition system.  **

## 2023-12-09 NOTE — PROGRESS NOTES
[address]  Progress Note  Name: Divina Gibson  MRN: [id number]  Unit/Bed#: ED 06 I Date of Admission: 12/8/2023   Date of Service: 12/9/2023 I Hospital Day: 1    Assessment/Plan   * Flank pain  Assessment & Plan  Patient sent to [facility] ED from Urology office for c/o of severe flank pain. In office, POC Urine dip +leukocytes. Hx of recurrent UTI. Patient denies fever, chills, urgency, frequency, n/v/d.   CT Scan: Mild diffuse wall thickening of the urinary bladder. Receive Zosyn, Ceftriaxone, and ertapenem - discontinue all and monitor off antibiotics as he is asymptomatic  Remains afebrile, without leukocytosis, VSS  Pain in clearly reproducible, MSK origin suspected  Start scheduled tylenol, lidoderm, topical heat, bengay if desired, and trial robaxin  Counseled on risks/benefits of muscle relaxers     Stage 3 chronic kidney disease, unspecified whether stage 3a or 3b CKD ([address])  Assessment & Plan  Lab Results   Component Value Date    EGFR 69 12/09/2023    EGFR 62 12/08/2023    EGFR 67 10/14/2023    CREATININE 1.07 12/09/2023    CREATININE 1.18 12/08/2023    CREATININE 1.11 10/14/2023     Cr. at baseline  Monitor BMP  Avoid nephrotoxins  Avoid hypotension    Type 2 diabetes mellitus with diabetic nephropathy, with long-term current use of insulin ([facility])  Assessment & Plan  Blood Sugar Average Last 72 hrs:(P) 119  Controlled at home on insulin, A1c 6.7   SSI coverage, adjust as needed for goal 140-180  Levemir 30u at night, humalog 8u with meals  Hypoglycemia protocol  POC glucose checks AC/HS      Primary hypertension  Assessment & Plan  Bp acceptable, Blood Pressure: 140/66  Continue home dose amlodipine, lisinopril    PVD (peripheral vascular disease) ([address])  Assessment & Plan  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 Sequential Compression Devices Ordered. Mobility:      Not yet assessed     Patient Centered Rounds: I evaluated the patient without nursing staff present due to d/w nurse independent of examination    Discussions with Specialists or Other Care Team Provider:     Education and Discussions with Family / Patient: Patient declined call to . Total Time Spent on Date of Encounter in care of patient: 46 mins. This time was spent on one or more of the following: performing physical exam; counseling and coordination of care; obtaining or reviewing history; documenting in the medical record; reviewing/ordering tests, medications or procedures; communicating with other healthcare professionals and discussing with patient's family/caregivers. Current Length of Stay: 1 day(s)  Current Patient Status: Inpatient   Certification Statement: The patient will continue to require additional inpatient hospital stay due to pending response to pain treatment   Discharge Plan: Anticipate discharge later today or tomorrow to home. Code Status: Level 1 - Full Code    Subjective:   Patient reports continued pain, states feels like a pulled muscle. At home he tried tylenol and lidoderm with minimal effect, but did see some improvement with [facility]. Denies dysuria, fevers/chills, renal colic. States he had a stone years ago, this is not similar. Objective:     Vitals:   Temp (24hrs), Av.4 °F (36.9 °C), Min:98 °F (36.7 °C), Max:98.7 °F (37.1 °C)    Temp:  [98 °F (36.7 °C)-98.7 °F (37.1 °C)] 98 °F (36.7 °C)  HR:  [61-80] 63  Resp:  [10-18] 10  BP: (138-148)/(63-80) 140/66  SpO2:  [94 %-99 %] 98 %  There is no height or weight on file to calculate BMI. Input and Output Summary (last 24 hours):      Intake/Output Summary (Last 24 hours) at 2023 0778  Last data filed at 2023 0701  Gross per 24 hour   Intake 100 ml   Output 400 ml   Net -300 ml       Physical Exam:   Physical Exam  Vitals and nursing note reviewed. Constitutional:       General: He is not in acute distress. Appearance: Normal appearance. He is obese. He is not ill-appearing or toxic-appearing. Cardiovascular:      Rate and Rhythm: Normal rate and regular rhythm. Heart sounds: Normal heart sounds. No murmur heard. Pulmonary:      Effort: Pulmonary effort is normal. No respiratory distress. Breath sounds: Normal breath sounds. Abdominal:      General: Bowel sounds are normal. There is no distension. Palpations: Abdomen is soft. Tenderness: There is no abdominal tenderness. Musculoskeletal:      Left Lower Extremity: Left leg is amputated below knee. Neurological:      Mental Status: He is alert and oriented to person, place, and time. Psychiatric:         Mood and Affect: Mood normal.         Behavior: Behavior normal.           Additional Data:     Labs:  Results from last 7 days   Lab Units 12/09/23  0410 12/08/23  2204 12/08/23  1648   WBC Thousand/uL 5.72  --  7.02   HEMOGLOBIN g/dL 8.9*  --  9.2*   HEMATOCRIT % 29.8*  --  30.7*   PLATELETS Thousands/uL 243   < > 276   NEUTROS PCT %  --   --  69   LYMPHS PCT %  --   --  19   MONOS PCT %  --   --  9   EOS PCT %  --   --  2    < > = values in this interval not displayed.      Results from last 7 days   Lab Units 12/09/23  0410 12/08/23  1648   SODIUM mmol/L 138 138   POTASSIUM mmol/L 4.0 4.6   CHLORIDE mmol/L 106 107   CO2 mmol/L 24 26   BUN mg/dL 27* 29*   CREATININE mg/dL 1.07 1.18   ANION GAP mmol/L 8 5   CALCIUM mg/dL 8.5 9.0   ALBUMIN g/dL  --  4.0   TOTAL BILIRUBIN mg/dL  --  0.29   ALK PHOS U/L  --  59   ALT U/L  --  8   AST U/L  --  9*   GLUCOSE RANDOM mg/dL 153* 182*         Results from last 7 days   Lab Units 12/09/23  0725   POC GLUCOSE mg/dl 119               Lines/Drains:  Invasive Devices       Peripheral Intravenous Line  Duration             Peripheral IV 12/08/23 Right Antecubital <1 day                          Imaging: Reviewed radiology reports from this admission including: abdominal/pelvic CT    Recent Cultures (last 7 days):   Results from last 7 days   Lab Units 12/08/23  1950   BLOOD CULTURE  Received in Microbiology Lab. Culture in Progress. Received in Microbiology Lab. Culture in Progress. Last 24 Hours Medication List:   Current Facility-Administered Medications   Medication Dose Route Frequency Provider Last Rate    acetaminophen  650 mg Oral Q6H PRN [facility], CRNP      amLODIPine  5 mg Oral Daily [facility], CRNP      atorvastatin  20 mg Oral Daily [facility], CRNP      clopidogrel  75 mg Oral Daily [facility], CRNP      docusate sodium  100 mg Oral BID [facility], CRNP      ertapenem  1,000 mg Intravenous Q24H Melvina Castellon MD Stopped (12/08/23 0050)    finasteride  5 mg Oral Daily [facility], CRNP      gabapentin  300 mg Oral TID [facility], CRNP      heparin (porcine)  5,000 Units Subcutaneous Q8H [facility], CRNP      insulin detemir  30 Units Subcutaneous HS [facility], CRNP      insulin lispro  1-6 Units Subcutaneous TID AC [facility], CRNP      insulin lispro  8 Units Subcutaneous TID With Meals [facility], CRNP      lisinopril  2.5 mg Oral Q24H [facility], CRNP      ondansetron  4 mg Intravenous Q6H PRN [facility], CRNP      oxybutynin  5 mg Oral Daily [facility], CRNP      pantoprazole  40 mg Oral Daily [facility], CRNP      sodium chloride  75 mL/hr Intravenous Continuous [facility], [facility] 75 mL/hr (12/08/23 2215)    tamsulosin  0.4 mg Oral Daily [facility] Dinner [facility]          Today, Patient Was Seen By: Lazarus Lazaro PA-C    **Please Note: This note may have been constructed using a voice recognition system. **

## 2023-12-09 NOTE — DISCHARGE SUMMARY
[address]  Discharge- Brock Aw 1955, 77 y.o. male MRN: [id number]  Unit/Bed#: ED 06 Encounter: [id number]  Primary Care Provider: Talia Metcalf MD   Date and time admitted to hospital: 12/8/2023  4:17 PM    * Flank pain  Assessment & Plan  [facility] [place] [facility] [place]rology office for c/o of severe flank pain. In office, POC Urine dip +leukocytes. Hx of recurrent UTI. Patient denies fever, chills, urgency, frequency, n/v/d.   CT Scan: Mild diffuse wall thickening of the urinary bladder.    Receive Zosyn, Ceftriaxone, and ertapenem - discontinue all and monitor off antibiotics as he is asymptomatic  Remains afebrile, without leukocytosis, VSS  Pain in clearly reproducible, MSK origin suspected  Start scheduled tylenol, lidoderm, topical heat, bengay if desired, and trial robaxin (improved)  Counseled on risks/benefits of muscle relaxers     Stage 3 chronic kidney disease, unspecified whether stage 3a or 3b CKD [facility])  Assessment & Plan  Lab Results   Component Value Date    EGFR 69 12/09/2023    EGFR 62 12/08/2023    EGFR 67 10/14/2023    CREATININE 1.07 12/09/2023    CREATININE 1.18 12/08/2023    CREATININE 1.11 10/14/2023     Cr. at baseline    Type 2 diabetes mellitus with diabetic nephropathy, with long-term current use of insulin ([facility])  Assessment & Plan  Blood Sugar Average Last 72 hrs:(P) 176.5  Controlled at home on insulin, A1c 6.7   Continue home regimen on discharge       Primary hypertension  Assessment & Plan  Bp acceptable, Blood Pressure: 131/62  Continue home dose amlodipine, lisinopril    PVD (peripheral vascular disease) ([address])  Assessment & Plan  S/P Left BKA 4/6/23, uses prosthesis  Followed outpatient by Vascular surgery      Medical Problems       Resolved Problems  Date Reviewed: 12/9/2023   None       Discharging Physician / Practitioner: Henok Arellano PA-C  PCP: Talia Metcalf MD  Admission Date:   Admission Orders (From admission, onward)       Ordered 12/08/23 2109  INPATIENT ADMISSION  Once                          Discharge Date: 12/09/23    Consultations During Hospital Stay:  None     Procedures Performed:   None     Significant Findings / Test Results:   CT abdomen pelvis with contrast   Final Result by Dale Diane MD (12/08 1900)      Mild diffuse wall thickening of the urinary bladder. Clinical correlation for cystitis. XR chest 1 view portable   Final Result by Jonas Diane MD (12/09 7200)      No acute cardiopulmonary disease. Incidental Findings:        Test Results Pending at Discharge (will require follow up): Outpatient Tests Requested:      Complications:  none     Reason for Admission: flank pain, r/o UTI or kidney stone     Hospital Course:   Sina Terrell is a 77 y.o. male patient who originally presented to the hospital on 12/8/2023 due to flank pain. Patient was seen in the urology office and complained of significant flank pain and was referred to the ER. Workup including CT abdomen/pelvis as well as labs were unremarkable. Pain reproducible on exam and responded well to robaxin. Advised on sedating effect of medication and risk for falls, patient reports he will take at bedtime only and use OTC medications otherwise. Please see above list of diagnoses and related plan for additional information. Condition at Discharge: good    Discharge Day Visit / Exam:   * Please refer to separate progress note for these details *    Discussion with Family: Patient declined call to . Discharge instructions/Information to patient and family:   See after visit summary for information provided to patient and family. Provisions for Follow-Up Care:  See after visit summary for information related to follow-up care and any pertinent home health orders.       Mobility at time of Discharge:   NATALIE Achieved: 1: Laying in bed  Not assessed      Disposition:   Home    Planned Readmission: no Discharge Statement:  I spent 32 minutes discharging the patient. This time was spent on the day of discharge. I had direct contact with the patient on the day of discharge. Greater than 50% of the total time was spent examining patient, answering all patient questions, arranging and discussing plan of care with patient as well as directly providing post-discharge instructions. Additional time then spent on discharge activities. Discharge Medications:  See after visit summary for reconciled discharge medications provided to patient and/or family.       **Please Note: This note may have been constructed using a voice recognition system**

## 2023-12-09 NOTE — ASSESSMENT & PLAN NOTE
Lab Results   Component Value Date    HGBA1C 6.7 (H) 07/17/2023       No results for input(s): "POCGLU" in the last 72 hours.     Blood Sugar Average: Last 72 hrs:  blood sugars well controlled at home on insulin  SSI coverage, adjust as needed for goal 140-180  Levemir 30u at night, humalog 8u with meals  Hypoglycemia protocol  POC glucose checks AC/HS

## 2023-12-09 NOTE — ASSESSMENT & PLAN NOTE
Lab Results   Component Value Date    EGFR 69 12/09/2023    EGFR 62 12/08/2023    EGFR 67 10/14/2023    CREATININE 1.07 12/09/2023    CREATININE 1.18 12/08/2023    CREATININE 1.11 10/14/2023     Cr. at baseline

## 2023-12-09 NOTE — ASSESSMENT & PLAN NOTE
Lab Results   Component Value Date    EGFR 69 12/09/2023    EGFR 62 12/08/2023    EGFR 67 10/14/2023    CREATININE 1.07 12/09/2023    CREATININE 1.18 12/08/2023    CREATININE 1.11 10/14/2023     Cr. at baseline  Monitor BMP  Avoid nephrotoxins  Avoid hypotension

## 2023-12-09 NOTE — ASSESSMENT & PLAN NOTE
Blood Sugar Average Last 72 hrs:(P) 119  Controlled at home on insulin, A1c 6.7   SSI coverage, adjust as needed for goal 140-180  Levemir 30u at night, humalog 8u with meals  Hypoglycemia protocol  POC glucose checks AC/HS

## 2023-12-09 NOTE — ASSESSMENT & PLAN NOTE
Patient sent to [facility] ED from Urology office for c/o of severe flank pain. In office, POC Urine dip +leukocytes. Hx of recurrent UTI. Patient denies fever, chills, urgency, frequency, n/v/d.   CT Scan: Mild diffuse wall thickening of the urinary bladder. Clinical correlation for cystitis   IV Rocephin and Zosyn given in the ED.  Will start Adrian Farfan as patient has hx of ESBL  ID consult, recommendations appreciated  IV hydration  Supportive therapy

## 2023-12-09 NOTE — ASSESSMENT & PLAN NOTE
Lab Results   Component Value Date    EGFR 62 12/08/2023    EGFR 67 10/14/2023    EGFR 61 10/13/2023    CREATININE 1.18 12/08/2023    CREATININE 1.11 10/14/2023    CREATININE 1.19 10/13/2023     Cr. 1.18 today, at baseline  Daily BMP  Avoid nephrotoxins  Avoid hypotension

## 2023-12-09 NOTE — PLAN OF CARE
Problem: Prexisting or High Potential for Compromised Skin Integrity  Goal: Skin integrity is maintained or improved  Description: INTERVENTIONS:  - Identify patients at risk for skin breakdown  - Assess and monitor skin integrity  - Assess and monitor nutrition and hydration status  - Monitor labs   - Assess for incontinence   - Turn and reposition patient  - Assist with mobility/ambulation  - Relieve pressure over bony prominences  - Avoid friction and shearing  - Provide appropriate hygiene as needed including keeping skin clean and dry  - Evaluate need for skin moisturizer/barrier cream  - Collaborate with interdisciplinary team   - Patient/family teaching  - Consider wound care consult   Outcome: Adequate for Discharge     Problem: PAIN - ADULT  Goal: Verbalizes/displays adequate comfort level or baseline comfort level  Description: Interventions:  - Encourage patient to monitor pain and request assistance  - Assess pain using appropriate pain scale  - Administer analgesics based on type and severity of pain and evaluate response  - Implement non-pharmacological measures as appropriate and evaluate response  - Consider cultural and social influences on pain and pain management  - Notify physician/advanced practitioner if interventions unsuccessful or patient reports new pain  Outcome: Adequate for Discharge     Problem: INFECTION - ADULT  Goal: Absence or prevention of progression during hospitalization  Description: INTERVENTIONS:  - Assess and monitor for signs and symptoms of infection  - Monitor lab/diagnostic results  - Monitor all insertion sites, i.e. indwelling lines, tubes, and drains  - Monitor endotracheal if appropriate and nasal secretions for changes in amount and color  - [place] appropriate cooling/warming therapies per order  - Administer medications as ordered  - Instruct and encourage patient and family to use good hand hygiene technique  - Identify and instruct in appropriate isolation precautions for identified infection/condition  Outcome: Adequate for Discharge  Goal: Absence of fever/infection during neutropenic period  Description: INTERVENTIONS:  - Monitor WBC    Outcome: Adequate for Discharge     Problem: SAFETY ADULT  Goal: Patient will remain free of falls  Description: INTERVENTIONS:  - Educate patient/family on patient safety including physical limitations  - Instruct patient to call for assistance with activity   - Consult OT/PT to assist with strengthening/mobility   - Keep Call bell within reach  - Keep bed low and locked with side rails adjusted as appropriate  - Keep care items and personal belongings within reach  - Initiate and maintain comfort rounds  - Make Fall Risk Sign visible to staff  - Offer Toileting every 2 Hours, in advance of need  - Initiate/Maintain bed alarm  - Obtain necessary fall risk management equipment:   - Apply yellow socks and bracelet for high fall risk patients  - Consider moving patient to room near nurses station  Outcome: Adequate for Discharge  Goal: Maintain or return to baseline ADL function  Description: INTERVENTIONS:  -  Assess patient's ability to carry out ADLs; assess patient's baseline for ADL function and identify physical deficits which impact ability to perform ADLs (bathing, care of mouth/teeth, toileting, grooming, dressing, etc.)  - Assess/evaluate cause of self-care deficits   - Assess range of motion  - Assess patient's mobility; develop plan if impaired  - Assess patient's need for assistive devices and provide as appropriate  - Encourage maximum independence but intervene and supervise when necessary  - Involve family in performance of ADLs  - Assess for home care needs following discharge   - Consider OT consult to assist with ADL evaluation and planning for discharge  - Provide patient education as appropriate  Outcome: Adequate for Discharge  Goal: Maintains/Returns to pre admission functional level  Description: INTERVENTIONS:  - Perform AM-PAC 6 Click Basic Mobility/ Daily Activity assessment daily.  - Set and communicate daily mobility goal to care team and patient/family/caregiver. - Collaborate with rehabilitation services on mobility goals if consulted  - Perform Range of Motion 2 times a day. - Reposition patient every 2 hours.   - Dangle patient 2 times a day  - Stand patient 2 times a day  - Ambulate patient 2 times a day  - Out of bed to chair 2 times a day   - Out of bed for meals 3 times a day  - Out of bed for toileting  - Record patient progress and toleration of activity level   Outcome: Adequate for Discharge     Problem: METABOLIC, FLUID AND ELECTROLYTES - ADULT  Goal: Glucose maintained within target range  Description: INTERVENTIONS:  - Monitor Blood Glucose as ordered  - Assess for signs and symptoms of hyperglycemia and hypoglycemia  - Administer ordered medications to maintain glucose within target range  - Assess nutritional intake and initiate nutrition service referral as needed  Outcome: Adequate for Discharge     Problem: MUSCULOSKELETAL - ADULT  Goal: Maintain or return mobility to safest level of function  Description: INTERVENTIONS:  - Assess patient's ability to carry out ADLs; assess patient's baseline for ADL function and identify physical deficits which impact ability to perform ADLs (bathing, care of mouth/teeth, toileting, grooming, dressing, etc.)  - Assess/evaluate cause of self-care deficits   - Assess range of motion  - Assess patient's mobility  - Assess patient's need for assistive devices and provide as appropriate  - Encourage maximum independence but intervene and supervise when necessary  - Involve family in performance of ADLs  - Assess for home care needs following discharge   - Consider OT consult to assist with ADL evaluation and planning for discharge  - Provide patient education as appropriate  Outcome: Adequate for Discharge  Goal: Maintain proper alignment of affected body part  Description: INTERVENTIONS:  - Support, maintain and protect limb and body alignment  - Provide patient/ family with appropriate education  Outcome: Adequate for Discharge

## 2023-12-09 NOTE — PLAN OF CARE
Problem: Prexisting or High Potential for Compromised Skin Integrity  Goal: Skin integrity is maintained or improved  Description: INTERVENTIONS:  - Identify patients at risk for skin breakdown  - Assess and monitor skin integrity  - Assess and monitor nutrition and hydration status  - Monitor labs   - Assess for incontinence   - Turn and reposition patient  - Assist with mobility/ambulation  - Relieve pressure over bony prominences  - Avoid friction and shearing  - Provide appropriate hygiene as needed including keeping skin clean and dry  - Evaluate need for skin moisturizer/barrier cream  - Collaborate with interdisciplinary team   - Patient/family teaching  - Consider wound care consult   Outcome: Progressing     Problem: PAIN - ADULT  Goal: Verbalizes/displays adequate comfort level or baseline comfort level  Description: Interventions:  - Encourage patient to monitor pain and request assistance  - Assess pain using appropriate pain scale  - Administer analgesics based on type and severity of pain and evaluate response  - Implement non-pharmacological measures as appropriate and evaluate response  - Consider cultural and social influences on pain and pain management  - Notify physician/advanced practitioner if interventions unsuccessful or patient reports new pain  Outcome: Progressing     Problem: INFECTION - ADULT  Goal: Absence or prevention of progression during hospitalization  Description: INTERVENTIONS:  - Assess and monitor for signs and symptoms of infection  - Monitor lab/diagnostic results  - Monitor all insertion sites, i.e. indwelling lines, tubes, and drains  - Monitor endotracheal if appropriate and nasal secretions for changes in amount and color  - [place] appropriate cooling/warming therapies per order  - Administer medications as ordered  - Instruct and encourage patient and family to use good hand hygiene technique  - Identify and instruct in appropriate isolation precautions for identified infection/condition  Outcome: Progressing  Goal: Absence of fever/infection during neutropenic period  Description: INTERVENTIONS:  - Monitor WBC    Outcome: Progressing     Problem: SAFETY ADULT  Goal: Patient will remain free of falls  Description: INTERVENTIONS:  - Educate patient/family on patient safety including physical limitations  - Instruct patient to call for assistance with activity   - Consult OT/PT to assist with strengthening/mobility   - Keep Call bell within reach  - Keep bed low and locked with side rails adjusted as appropriate  - Keep care items and personal belongings within reach  - Initiate and maintain comfort rounds  - Make Fall Risk Sign visible to staff  - Apply yellow socks and bracelet for high fall risk patients  - Consider moving patient to room near nurses station  Outcome: Progressing  Goal: Maintain or return to baseline ADL function  Description: INTERVENTIONS:  -  Assess patient's ability to carry out ADLs; assess patient's baseline for ADL function and identify physical deficits which impact ability to perform ADLs (bathing, care of mouth/teeth, toileting, grooming, dressing, etc.)  - Assess/evaluate cause of self-care deficits   - Assess range of motion  - Assess patient's mobility; develop plan if impaired  - Assess patient's need for assistive devices and provide as appropriate  - Encourage maximum independence but intervene and supervise when necessary  - Involve family in performance of ADLs  - Assess for home care needs following discharge   - Consider OT consult to assist with ADL evaluation and planning for discharge  - Provide patient education as appropriate  Outcome: Progressing  Goal: Maintains/Returns to pre admission functional level  Description: INTERVENTIONS:  - Perform AM-PAC 6 Click Basic Mobility/ Daily Activity assessment daily.  - Set and communicate daily mobility goal to care team and patient/family/caregiver.    - Collaborate with rehabilitation services on mobility goals if consulted  - Out of bed for toileting  - Record patient progress and toleration of activity level   Outcome: Progressing     Problem: METABOLIC, FLUID AND ELECTROLYTES - ADULT  Goal: Glucose maintained within target range  Description: INTERVENTIONS:  - Monitor Blood Glucose as ordered  - Assess for signs and symptoms of hyperglycemia and hypoglycemia  - Administer ordered medications to maintain glucose within target range  - Assess nutritional intake and initiate nutrition service referral as needed  Outcome: Progressing     Problem: MUSCULOSKELETAL - ADULT  Goal: Maintain or return mobility to safest level of function  Description: INTERVENTIONS:  - Assess patient's ability to carry out ADLs; assess patient's baseline for ADL function and identify physical deficits which impact ability to perform ADLs (bathing, care of mouth/teeth, toileting, grooming, dressing, etc.)  - Assess/evaluate cause of self-care deficits   - Assess range of motion  - Assess patient's mobility  - Assess patient's need for assistive devices and provide as appropriate  - Encourage maximum independence but intervene and supervise when necessary  - Involve family in performance of ADLs  - Assess for home care needs following discharge   - Consider OT consult to assist with ADL evaluation and planning for discharge  - Provide patient education as appropriate  Outcome: Progressing  Goal: Maintain proper alignment of affected body part  Description: INTERVENTIONS:  - Support, maintain and protect limb and body alignment  - Provide patient/ family with appropriate education  Outcome: Progressing

## 2023-12-10 LAB — BACTERIA UR CULT: NORMAL

## 2023-12-13 LAB
BACTERIA BLD CULT: NORMAL
BACTERIA BLD CULT: NORMAL

## 2024-03-18 ENCOUNTER — OFFICE VISIT (OUTPATIENT)
Dept: UROLOGY | Facility: CLINIC | Age: 69
End: 2024-03-18
Payer: MEDICARE

## 2024-03-18 ENCOUNTER — TELEPHONE (OUTPATIENT)
Dept: VASCULAR SURGERY | Facility: CLINIC | Age: 69
End: 2024-03-18

## 2024-03-18 VITALS
DIASTOLIC BLOOD PRESSURE: 78 MMHG | OXYGEN SATURATION: 98 % | SYSTOLIC BLOOD PRESSURE: 132 MMHG | WEIGHT: 233 LBS | BODY MASS INDEX: 28.97 KG/M2 | RESPIRATION RATE: 16 BRPM | HEIGHT: 75 IN | HEART RATE: 92 BPM | TEMPERATURE: 98 F

## 2024-03-18 DIAGNOSIS — N50.82 SCROTAL PAIN: Primary | ICD-10-CM

## 2024-03-18 PROCEDURE — 99213 OFFICE O/P EST LOW 20 MIN: CPT | Performed by: PHYSICIAN ASSISTANT

## 2024-03-18 RX ORDER — CIPROFLOXACIN 500 MG/1
TABLET, FILM COATED ORAL
COMMUNITY
Start: 2024-02-23 | End: 2024-03-18

## 2024-03-18 RX ORDER — CEFUROXIME AXETIL 500 MG/1
TABLET ORAL
COMMUNITY
Start: 2024-02-01 | End: 2024-03-18

## 2024-03-18 RX ORDER — CEPHALEXIN 500 MG/1
500 CAPSULE ORAL EVERY 12 HOURS SCHEDULED
Qty: 20 CAPSULE | Refills: 0 | Status: SHIPPED | OUTPATIENT
Start: 2024-03-18 | End: 2024-03-28

## 2024-03-18 RX ORDER — DOXYCYCLINE HYCLATE 100 MG/1
CAPSULE ORAL
COMMUNITY
Start: 2024-01-25 | End: 2024-03-18

## 2024-03-18 NOTE — TELEPHONE ENCOUNTER
Received call from pt's daughter stating pt found a new vascular surgeon in [place] who he now follows up with. Advised her to give us a call if he would like to come back for a visit with our office. She voiced understanding.

## 2024-03-18 NOTE — PROGRESS NOTES
3/18/2024      Chief Complaint   Patient presents with    Groin Swelling         Assessment and Plan    68 y.o. male     1. Right testicular pain and swelling  2. 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 No other findings.   - Conservative measures with scrotal support, elevation, ibuprofen, warm soaks  - US scrotum and testicles  - Keflex sent to pharmacy for presumed orchitis/skin infection     3. Prostate cancer screening  4. Family history of prostate cancer, brother  - PSA (11/6/23) 1.17  - Call with any questions or concerns in the meantime  - All questions answered; patient understands and agrees with plan       History of Present Illness  Marc Robison is a 68 y.o. male patient with history of ecurrent UTI, BPH with LUTS, urge urinary incontinence, history of left radical orchiectomy in 2021 with outside urologist  here for follow up.     States he is having right testicular pain and swelling x 1 day. Denies precipitating events. Denies redness, drainage, openings, warmth. Denies gross hematuria, dysuria, flank pain, fever. PSA up to date 1.17.     Review of Systems   Constitutional:  Negative for activity change, appetite change, chills and fever.   HENT:  Negative for congestion and trouble swallowing.    Respiratory:  Negative for cough and shortness of breath.    Cardiovascular:  Negative for chest pain, palpitations and leg swelling.   Gastrointestinal:  Negative for abdominal pain, constipation, diarrhea, nausea and vomiting.   Genitourinary:  Positive for scrotal swelling and testicular pain. Negative for difficulty urinating, dysuria, flank pain, frequency, hematuria and urgency.   Musculoskeletal:  Negative for back pain and gait problem.   Skin:  Negative for wound.   Allergic/Immunologic: Negative for immunocompromised state.   Neurological:   "Negative for dizziness and syncope.   Hematological:  Does not bruise/bleed easily.   Psychiatric/Behavioral:  Negative for confusion.    All other systems reviewed and are negative.          AUA SYMPTOM SCORE      Flowsheet Row Most Recent Value   AUA SYMPTOM SCORE    How often have you had a sensation of not emptying your bladder completely after you finished urinating? 1 (P)    How often have you had to urinate again less than two hours after you finished urinating? 4 (P)    How often have you found you stopped and started again several times when you urinate? 1 (P)    How often have you found it difficult to postpone urination? 5 (P)    How often have you had a weak urinary stream? 3 (P)    How often have you had to push or strain to begin urination? 0 (P)    How many times did you most typically get up to urinate from the time you went to bed at night until the time you got up in the morning? 5 (P)    Quality of Life: If you were to spend the rest of your life with your urinary condition just the way it is now, how would you feel about that? 5 (P)    AUA SYMPTOM SCORE 19 (P)              Vitals  Vitals:    03/18/24 0759   BP: 132/78   Pulse: 92   Resp: 16   Temp: 98 °F (36.7 °C)   SpO2: 98%   Weight: 106 kg (233 lb)   Height: 6' 3\" (1.905 m)       Physical Exam  Constitutional:       General: He is not in acute distress.     Appearance: Normal appearance. He is not ill-appearing, toxic-appearing or diaphoretic.   HENT:      Head: Normocephalic.      Nose: No congestion.   Eyes:      General: No scleral icterus.        Right eye: No discharge.         Left eye: No discharge.      Conjunctiva/sclera: Conjunctivae normal.      Pupils: Pupils are equal, round, and reactive to light.   Pulmonary:      Effort: Pulmonary effort is normal.   Genitourinary:     Comments: Right sided tenderness on palpation. No crepitus, swelling, erythema, openings, drainage.   Musculoskeletal:      Cervical back: Normal range of " motion.   Skin:     General: Skin is warm and dry.      Coloration: Skin is not jaundiced or pale.      Findings: No bruising, erythema, lesion or rash.   Neurological:      General: No focal deficit present.      Mental Status: He is alert and oriented to person, place, and time. Mental status is at baseline.      Gait: Gait normal.   Psychiatric:         Mood and Affect: Mood normal.         Behavior: Behavior normal.         Thought Content: Thought content normal.         Judgment: Judgment normal.           Past History  Past Medical History:   Diagnosis Date    BPH (benign prostatic hypertrophy) 10 /2020    Chronic kidney disease 8/2020    Diabetes mellitus (HCC)     Foot ulcer due to secondary DM (HCC)     Hypertension 4 /2021    Urinary incontinence 8/2022    Urinary tract infection 4/2023     Social History     Socioeconomic History    Marital status: /Civil Union     Spouse name: None    Number of children: None    Years of education: None    Highest education level: None   Occupational History    None   Tobacco Use    Smoking status: Former     Current packs/day: 0.00     Average packs/day: 1.5 packs/day for 40.0 years (60.0 ttl pk-yrs)     Types: Cigarettes, Pipe     Start date: 1/1/1969     Quit date: 1/1/2009     Years since quitting: 15.2     Passive exposure: Past    Smokeless tobacco: Never    Tobacco comments:     already have   Vaping Use    Vaping status: Never Used   Substance and Sexual Activity    Alcohol use: Not Currently    Drug use: Never    Sexual activity: Not Currently     Partners: Female     Birth control/protection: None   Other Topics Concern    None   Social History Narrative    None     Social Determinants of Health     Financial Resource Strain: Low Risk  (10/21/2023)    Received from [facility]    Overall Financial Resource Strain (CARDIA)     Difficulty of Paying Living Expenses: Not very hard   Food Insecurity: Unknown (10/21/2023)    Received from [facility]     Hunger Vital Sign     Worried About Running Out of Food in the Last Year: Never true     Ran Out of Food in the Last Year: Not on file   Transportation Needs: Unknown (10/21/2023)    Received from [facility]    PRAPARE - Transportation     Lack of Transportation (Medical): No     Lack of Transportation (Non-Medical): Not on file   Physical Activity: Unknown (11/6/2021)    Received from [facility]    Exercise Vital Sign     Days of Exercise per Week: 0 days     Minutes of Exercise per Session: Not on file   Stress: No Stress Concern Present (11/2/2020)    Received from [facility]    [place] [employer] - Occupational Stress Questionnaire     Feeling of Stress : Not at all   Social Connections: Unknown (10/21/2023)    Received from [facility]    Social Connection and Isolation Panel [NHANES]     Frequency of Communication with Friends and Family: Once a week     Frequency of Social Gatherings with Friends and Family: Not on file     Attends Scientologist Services: Not on file     Active Member of Clubs or Organizations: Not on file     Attends Club or Organization Meetings: Not on file     Marital Status: Not on file   Intimate Partner Violence: Unknown (10/21/2023)    Received from [facility]    Humiliation, Afraid, Rape, and Kick questionnaire     Fear of Current or Ex-Partner: No     Emotionally Abused: Not on file     Physically Abused: Not on file     Sexually Abused: Not on file   Housing Stability: High Risk (10/21/2023)    Received from [facility]    Housing Stability Vital Sign     Unable to Pay for Housing in the Last Year: Yes     Number of Places Lived in the Last Year: Not on file     Unstable Housing in the Last Year: Not on file     Social History     Tobacco Use   Smoking Status Former    Current packs/day: 0.00    Average packs/day: 1.5 packs/day for 40.0 years (60.0 ttl pk-yrs)    Types: Cigarettes, Pipe    Start date: 1/1/1969    Quit date: 1/1/2009     Years since quitting: 15.2    Passive exposure: Past   Smokeless Tobacco Never   Tobacco Comments    already have     Family History   Problem Relation Age of Onset    Heart attack Father     Heart disease Father          of massive heart attack    Cancer Mother          stomach cancer    Diabetes Mother     No Known Problems Brother     No Known Problems Daughter        The following portions of the patient's history were reviewed and updated as appropriate: allergies, current medications, past medical history, past social history, past surgical history and problem list.    Results  No results found for this or any previous visit (from the past 1 hour(s)).]  Lab Results   Component Value Date    PSA 1.17 2023     Lab Results   Component Value Date    CALCIUM 8.5 2023    K 4.0 2023    CO2 24 2023     2023    BUN 27 (H) 2023    CREATININE 1.07 2023     Lab Results   Component Value Date    WBC 5.72 2023    HGB 8.9 (L) 2023    HCT 29.8 (L) 2023    MCV 78 (L) 2023     2023       Arleth Tello PA-C

## 2024-03-22 ENCOUNTER — APPOINTMENT (EMERGENCY)
Dept: RADIOLOGY | Facility: HOSPITAL | Age: 69
End: 2024-03-22
Payer: MEDICARE

## 2024-03-22 ENCOUNTER — HOSPITAL ENCOUNTER (EMERGENCY)
Facility: HOSPITAL | Age: 69
Discharge: HOME | End: 2024-03-22
Attending: EMERGENCY MEDICINE
Payer: MEDICARE

## 2024-03-22 VITALS
SYSTOLIC BLOOD PRESSURE: 128 MMHG | DIASTOLIC BLOOD PRESSURE: 62 MMHG | RESPIRATION RATE: 20 BRPM | TEMPERATURE: 97.7 F | OXYGEN SATURATION: 96 % | HEART RATE: 74 BPM

## 2024-03-22 DIAGNOSIS — S40.021A ARM CONTUSION, RIGHT, INITIAL ENCOUNTER: Primary | ICD-10-CM

## 2024-03-22 PROCEDURE — 99284 EMERGENCY DEPT VISIT MOD MDM: CPT | Performed by: EMERGENCY MEDICINE

## 2024-03-22 PROCEDURE — 99283 EMERGENCY DEPT VISIT LOW MDM: CPT

## 2024-03-22 PROCEDURE — 73060 X-RAY EXAM OF HUMERUS: CPT

## 2024-03-22 NOTE — ED PROVIDER NOTES
History  Chief Complaint   Patient presents with    Arm Injury     Pt was putting a new diabetic device on the right arm and the monitor broke. Pt had the needle still stuck in the arm and pt believes he removed the needle but wants to make sure       Arm Injury      Prior to Admission Medications   Prescriptions Last Dose Informant Patient Reported? Taking?   Diclofenac Sodium (VOLTAREN) 1 %  Self No No   Sig: Apply 2 g topically 4 (four) times a day   HumaLOG KwikPen 100 units/mL injection pen  Self Yes No   alfuzosin (UROXATRAL) 10 mg 24 hr tablet  Self No No   Sig: Take 1 tablet (10 mg total) by mouth daily   amLODIPine (NORVASC) 5 mg tablet  Self Yes No   Sig: Take 5 mg by mouth daily   atorvastatin (LIPITOR) 20 mg tablet  Self Yes No   Sig: Take 20 mg by mouth daily   cephalexin (KEFLEX) 500 mg capsule   No No   Sig: Take 1 capsule (500 mg total) by mouth every 12 (twelve) hours for 10 days   clopidogrel (PLAVIX) 75 mg tablet  Self Yes No   Sig: Take 75 mg by mouth daily   finasteride (PROSCAR) 5 mg tablet  Self No No   Sig: Take 1 tablet (5 mg total) by mouth daily   gabapentin (NEURONTIN) 300 mg capsule  Self Yes No   Sig: Take 300 mg by mouth 3 (three) times a day   insulin degludec (Tresiba FlexTouch) 200 units/mL CONCENTRATED U-200 injection pen  Self No No   Sig: Inject 36 Units under the skin daily at bedtime   lisinopril (ZESTRIL) 2.5 mg tablet  Self Yes No   Sig: every 24 hours   metFORMIN (GLUCOPHAGE) 500 mg tablet  Self Yes No   Sig: Take 1,000 mg by mouth 2 (two) times a day with meals   pantoprazole (PROTONIX) 40 mg tablet  Self No No   Sig: Take 1 tablet by mouth daily for 30 days   tolterodine (DETROL LA) 2 mg 24 hr capsule  Self No No   Sig: Take 1 capsule (2 mg total) by mouth daily      Facility-Administered Medications: None       Past Medical History:   Diagnosis Date    BPH (benign prostatic hypertrophy) 10 /2020    Chronic kidney disease 8/2020    Diabetes mellitus (HCC)     Foot ulcer  due to secondary DM (HCC)     Hypertension     Urinary incontinence 2022    Urinary tract infection 2023       Past Surgical History:   Procedure Laterality Date    ESOPHAGOGASTRODUODENOSCOPY N/A 02/10/2017    Procedure: ESOPHAGOGASTRODUODENOSCOPY (EGD);  Surgeon: Ky Gomez MD;  Location: [facility];  Service:     JOINT REPLACEMENT      LEG AMPUTATION THROUGH LOWER TIBIA AND FIBULA Left 2023    Procedure: AMPUTATION BELOW KNEE (BKA);  Surgeon: Gregorio Aldridge DO;  Location: [facility];  Service: Vascular    ORCHIECTOMY  2022    they thought It was cancer but turned out not to be       Family History   Problem Relation Age of Onset    Heart attack Father     Heart disease Father          of massive heart attack    Cancer Mother          stomach cancer    Diabetes Mother     No Known Problems Brother     No Known Problems Daughter      I have reviewed and agree with the history as documented.    E-Cigarette/Vaping    E-Cigarette Use Never User      E-Cigarette/Vaping Substances    Nicotine No     THC No     CBD No     Flavoring No     Other No     Unknown No      Social History     Tobacco Use    Smoking status: Former     Current packs/day: 0.00     Average packs/day: 1.5 packs/day for 40.0 years (60.0 ttl pk-yrs)     Types: Cigarettes, Pipe     Start date: 1969     Quit date: 2009     Years since quitting: 15.2     Passive exposure: Past    Smokeless tobacco: Never    Tobacco comments:     already have   Vaping Use    Vaping status: Never Used   Substance Use Topics    Alcohol use: Not Currently    Drug use: Never       Review of Systems    Physical Exam  Physical Exam  Vitals and nursing note reviewed.   Constitutional:       General: He is not in acute distress.     Appearance: He is well-developed.   HENT:      Head: Normocephalic and atraumatic.   Eyes:      Conjunctiva/sclera: Conjunctivae normal.      Pupils: Pupils are equal, round, and reactive to light.   Neck:       Trachea: No tracheal deviation.   Cardiovascular:      Rate and Rhythm: Normal rate and regular rhythm.      Pulses:           Radial pulses are 2+ on the right side.   Pulmonary:      Effort: Pulmonary effort is normal. No respiratory distress.   Musculoskeletal:        Arms:       Cervical back: Normal range of motion.   Skin:     General: Skin is warm and dry.   Neurological:      Mental Status: He is alert and oriented to person, place, and time.      GCS: GCS eye subscore is 4. GCS verbal subscore is 5. GCS motor subscore is 6.   Psychiatric:         Mood and Affect: Mood and affect normal.         Behavior: Behavior normal.         Vital Signs  ED Triage Vitals   Temperature Pulse Respirations Blood Pressure SpO2   03/22/24 1329 03/22/24 1329 03/22/24 1329 03/22/24 1330 03/22/24 1329   97.7 °F (36.5 °C) 79 22 140/66 95 %      Temp Source Heart Rate Source Patient Position - Orthostatic VS BP Location FiO2 (%)   03/22/24 1329 03/22/24 1329 -- 03/22/24 1330 --   Oral Monitor  Left arm       Pain Score       --                  Vitals:    03/22/24 1329 03/22/24 1330 03/22/24 1400   BP:  140/66 128/62   Pulse: 79  74         Visual Acuity      ED Medications  Medications - No data to display    Diagnostic Studies  Results Reviewed       None                   XR humerus RIGHT   ED Interpretation by Angelique Padgett MD (03/22 1418)   Skin marker, no FB                 Procedures  Procedures         ED Course               Identification of Seniors at Risk      Flowsheet Row Most Recent Value   (ISAR) Identification of Seniors at Risk    Before the illness or injury that brought you to the Emergency, did you need someone to help you on a regular basis? 0 Filed at: 03/22/2024 1330   In the last 24 hours, have you needed more help than usual? 0 Filed at: 03/22/2024 1330   Have you been hospitalized for one or more nights during the past 6 months? 0 Filed at: 03/22/2024 1330   In general, do you see well?  0 Filed at: 03/22/2024 1330   In general, do you have serious problems with your memory? 0 Filed at: 03/22/2024 1330   Do you take more than three different medications every day? 0 Filed at: 03/22/2024 1330   ISAR Score 0 Filed at: 03/22/2024 1330                        SBIRT 22yo+      Flowsheet Row Most Recent Value   Initial Alcohol Screen: US AUDIT-C     1. How often do you have a drink containing alcohol? 0 Filed at: 03/22/2024 1330   2. How many drinks containing alcohol do you have on a typical day you are drinking?  0 Filed at: 03/22/2024 1330   3a. Male UNDER 65: How often do you have five or more drinks on one occasion? 0 Filed at: 03/22/2024 1330   Audit-C Score 0 Filed at: 03/22/2024 1330   DERRICK: How many times in the past year have you...    Used an illegal drug or used a prescription medication for non-medical reasons? Never Filed at: 03/22/2024 1330                      Medical Decision Making  This is a 68-year-old male who presents here today for right arm pain.  He wears a glucose sensing monitor to his right arm.  3 days ago he tried to change it and felt like the needle got stuck behind.  He thinks he removed the entirety of the needle but is continuing to have some irritation to the area.  He talked to his endocrinologist today while at an appointment, and was advised to follow-up with his doctor about it.  His doctor was unable to see him until April, so he came in for evaluation.  He denies fevers, redness, drainage, other complaints.    ROS: Otherwise negative, unless stated as above.    He is well-appearing, no acute distress.  He has a small area of hematoma where he is complaining of sensation of foreign body, but no obvious palpable foreign body.  He does have contusion distal to this with mild tenderness.  He has full range of motion of the arm.  Exam is otherwise unremarkable.  We will get an x-ray to evaluate for signs of retained needle type.    X-ray was reviewed by myself, and  shows no residual foreign bodies.  I discussed with the patient findings, management at home, follow-up, and indications for return, and expresses understanding with this plan.    Problems Addressed:  Arm contusion, right, initial encounter: acute illness or injury    Amount and/or Complexity of Data Reviewed  Radiology: ordered and independent interpretation performed. Decision-making details documented in ED Course.             Disposition  Final diagnoses:   Arm contusion, right, initial encounter     Time reflects when diagnosis was documented in both MDM as applicable and the Disposition within this note       Time User Action Codes Description Comment    3/22/2024  2:26 PM Angelique Padgett Add [S40.021A] Arm contusion, right, initial encounter           ED Disposition       ED Disposition   Discharge    Condition   Good    Date/Time   Fri Mar 22, 2024 1418    Comment   Marc Robison discharge to home/self care.             Follow-up Information       Follow up With Specialties Details Why Contact Info    Shayan Oh MD Family Medicine Schedule an appointment as soon as possible for a visit  As needed, to follow up [address]  [address]  [facility] [address]  [phone]              Patient's Medications   Discharge Prescriptions    No medications on file       No discharge procedures on file.    PDMP Review       None            ED Provider  Electronically Signed by             Angelique Padgett MD  03/22/24 [id number]

## 2024-03-22 NOTE — DISCHARGE INSTRUCTIONS
The x-ray shows that the needle was completely removed.  Pain to the area is likely due to small hematoma (bruise and swelling) underneath the skin.  Apply ice to help with pain and swelling.  Use your arm as you are able to tolerate.  Follow-up with your primary care doctor as needed.

## 2024-03-29 ENCOUNTER — HOSPITAL ENCOUNTER (OUTPATIENT)
Dept: ULTRASOUND IMAGING | Facility: HOSPITAL | Age: 69
End: 2024-03-29
Payer: MEDICARE

## 2024-03-29 DIAGNOSIS — N50.82 SCROTAL PAIN: ICD-10-CM

## 2024-03-29 PROCEDURE — 76870 US EXAM SCROTUM: CPT

## 2024-04-09 ENCOUNTER — TELEPHONE (OUTPATIENT)
Dept: UROLOGY | Facility: CLINIC | Age: 69
End: 2024-04-09

## 2024-04-09 NOTE — TELEPHONE ENCOUNTER
LVM per cc relaying Arleth ESPINOSA message,   Right sided cyst noted. No other findings. Conservative measures as discussed in office note.  Provided office number.     ----- Message from Arleth Tello PA-C sent at 4/8/2024  9:19 AM EDT -----  Right sided cyst noted. No other findings. Conservative measures as discussed in office note. Thanks